# Patient Record
Sex: FEMALE | Race: WHITE | Employment: OTHER | URBAN - METROPOLITAN AREA
[De-identification: names, ages, dates, MRNs, and addresses within clinical notes are randomized per-mention and may not be internally consistent; named-entity substitution may affect disease eponyms.]

---

## 2017-09-08 ENCOUNTER — HOSPITAL ENCOUNTER (OUTPATIENT)
Dept: GENERAL RADIOLOGY | Age: 77
Discharge: HOME | End: 2017-09-08
Attending: INTERNAL MEDICINE | Admitting: INTERNAL MEDICINE

## 2017-09-08 DIAGNOSIS — M81.8 OSTEOPOROSIS DUE TO AROMATASE INHIBITOR: ICD-10-CM

## 2017-09-08 DIAGNOSIS — M81.8 OTHER OSTEOPOROSIS WITHOUT CURRENT PATHOLOGICAL FRACTURE: ICD-10-CM

## 2017-09-08 DIAGNOSIS — Z85.3 PERSONAL HISTORY OF BREAST CANCER: ICD-10-CM

## 2017-09-08 DIAGNOSIS — T38.6X5A OSTEOPOROSIS DUE TO AROMATASE INHIBITOR: ICD-10-CM

## 2018-01-16 ENCOUNTER — OFFICE VISIT (OUTPATIENT)
Dept: SURGERY | Age: 78
End: 2018-01-16

## 2018-01-16 ENCOUNTER — HOSPITAL ENCOUNTER (OUTPATIENT)
Dept: MAMMOGRAPHY | Age: 78
Discharge: HOME | End: 2018-01-16
Attending: SURGERY | Admitting: SURGERY

## 2018-01-16 VITALS
WEIGHT: 135 LBS | SYSTOLIC BLOOD PRESSURE: 160 MMHG | HEIGHT: 61 IN | BODY MASS INDEX: 25.49 KG/M2 | DIASTOLIC BLOOD PRESSURE: 95 MMHG | HEART RATE: 76 BPM

## 2018-01-16 DIAGNOSIS — Z12.31 VISIT FOR SCREENING MAMMOGRAM: ICD-10-CM

## 2018-01-16 DIAGNOSIS — Z08 ENCOUNTER FOR FOLLOW-UP SURVEILLANCE OF BREAST CANCER: ICD-10-CM

## 2018-01-16 DIAGNOSIS — Z85.3 ENCOUNTER FOR FOLLOW-UP SURVEILLANCE OF BREAST CANCER: ICD-10-CM

## 2018-01-16 DIAGNOSIS — Z90.12 HISTORY OF PARTIAL MASTECTOMY, LEFT: ICD-10-CM

## 2018-01-16 DIAGNOSIS — Z85.3 PERSONAL HISTORY OF BREAST CANCER: Primary | ICD-10-CM

## 2018-01-16 PROCEDURE — G8399 PT W/DXA RESULTS DOCUMENT: HCPCS | Performed by: SURGERY

## 2018-01-16 PROCEDURE — 1090F PRES/ABSN URINE INCON ASSESS: CPT | Performed by: SURGERY

## 2018-01-16 PROCEDURE — G8484 FLU IMMUNIZE NO ADMIN: HCPCS | Performed by: SURGERY

## 2018-01-16 PROCEDURE — 4040F PNEUMOC VAC/ADMIN/RCVD: CPT | Performed by: SURGERY

## 2018-01-16 PROCEDURE — 1123F ACP DISCUSS/DSCN MKR DOCD: CPT | Performed by: SURGERY

## 2018-01-16 PROCEDURE — G8427 DOCREV CUR MEDS BY ELIG CLIN: HCPCS | Performed by: SURGERY

## 2018-01-16 PROCEDURE — 99213 OFFICE O/P EST LOW 20 MIN: CPT | Performed by: SURGERY

## 2018-01-16 PROCEDURE — G8419 CALC BMI OUT NRM PARAM NOF/U: HCPCS | Performed by: SURGERY

## 2018-01-16 PROCEDURE — 1036F TOBACCO NON-USER: CPT | Performed by: SURGERY

## 2018-01-16 RX ORDER — LOSARTAN POTASSIUM 100 MG/1
TABLET ORAL
Refills: 0 | COMMUNITY
Start: 2018-01-10

## 2018-01-16 NOTE — PATIENT INSTRUCTIONS
feel your breast tissue before moving on to the next spot. ¨ Check your entire breast, moving up and down as if following a strip from the collarbone to the bra line, and from the armpit to the ribs. Repeat until you have covered the entire breast.  ¨ Repeat this procedure for your left breast, using the pads of the 3 middle fingers of your right hand. · To examine your breasts while in the shower:  ¨ Place one arm over your head and lightly soap your breast on that side. ¨ Using the pads of your fingers, gently move your hand over your breast (in the strip pattern described above), feeling carefully for any lumps or changes. ¨ Repeat for the other breast.  · Have your doctor inspect anything you notice to see if you need further testing. Where can you learn more? Go to [email or web address] and sign in to your [employer] account. Enter P148 in the [employer] box to learn more about \"Breast Self-Exam: Care Instructions. \"     If you do not have an account, please click on the \"Sign Up Now\" link. Current as of: May 12, 2017  Content Version: 11.5  © 6540-4039 Healthwise, Incorporated. Care instructions adapted under license by [facility] ([facility]). If you have questions about a medical condition or this instruction, always ask your healthcare professional. [address] any warranty or liability for your use of this information.

## 2018-01-16 NOTE — PROGRESS NOTES
Patient Name: Piper Basurto  YOB: 1940  Primary Care Physician: Ethel Robison DO  Medical Oncologist: Emilie Steele MD     Subjective: Patient presents for follow up secondary to history of breast cancer. She reports that she is doing well and denies any signs/symptoms of recurrence. The patient has no breast complaints. She denies any bilateral palpable masses/lesions. She denies any bilateral skin changes/erythema/thickening/dimpling. She denies any nipple changes/retraction or discharge bilaterally.       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    2011  Stage 2A left breast, Invasive adenoid cystic carcinoma, NG 2  ER+, TX+, [id number] -  Tumor 2.1 cm  MIB 1 <10% (Low)        Vitals:    01/16/18 1323   BP: (!) 143/84   Pulse: 75   Weight: 135 lb (61.2 kg)   Height: 5' 1\" (1.549 m)             ROS  Constitutional: no weight loss, fever, night sweats   Skin: negative  Cardiovascular: no chest pain or palpitations   Pulmonary: No cough, sputum, or hemoptysis   GI:No abdominal pain  Breast: see above  All other systems were reviewed and are negative              Objective:  Breast:  Bilateral breasts are symmetrical. The bilateral nipples are everted without discharge and the skin is without erythema/thickening (peau d'orange)/dimpling. There are no palpable masses/lesions bilaterally. There are bilateral well healed surgical scars. There is a small skin lesion which is light pink in color measuring approximately 1 cm and is raised at the site of the patient's surgical scar superior central/lateral quadrant which is chronic and unchanged  Axilla:  No evidence of bilateral palpable adenopathy. There is a well healed left axillary scar  Extremity: Left upper extremity without evidence of lymphedema. Good range of motion.  Good  and upper extremity muscle

## 2018-07-06 ENCOUNTER — HOSPITAL ENCOUNTER (OUTPATIENT)
Dept: OTHER | Age: 78
Discharge: HOME | End: 2018-07-06
Attending: INTERNAL MEDICINE | Admitting: INTERNAL MEDICINE

## 2018-07-06 DIAGNOSIS — M25.559 PAIN IN JOINT INVOLVING PELVIC REGION AND THIGH, UNSPECIFIED LATERALITY: ICD-10-CM

## 2018-07-06 DIAGNOSIS — M25.562 ACUTE PAIN OF LEFT KNEE: ICD-10-CM

## 2018-09-05 ENCOUNTER — TELEPHONE (OUTPATIENT)
Dept: SURGERY | Age: 78
End: 2018-09-05

## 2019-03-29 ENCOUNTER — OFFICE VISIT (OUTPATIENT)
Dept: SURGERY | Age: 79
End: 2019-03-29
Payer: MEDICARE

## 2019-03-29 ENCOUNTER — HOSPITAL ENCOUNTER (OUTPATIENT)
Dept: WOMENS IMAGING | Age: 79
Discharge: HOME | End: 2019-03-29
Payer: MEDICARE

## 2019-03-29 VITALS
HEART RATE: 71 BPM | OXYGEN SATURATION: 98 % | TEMPERATURE: 97.7 F | BODY MASS INDEX: 24.35 KG/M2 | SYSTOLIC BLOOD PRESSURE: 111 MMHG | HEIGHT: 61 IN | DIASTOLIC BLOOD PRESSURE: 64 MMHG | WEIGHT: 129 LBS

## 2019-03-29 DIAGNOSIS — N64.59 ABNORMAL BREAST EXAM: Primary | ICD-10-CM

## 2019-03-29 DIAGNOSIS — Z12.39 BREAST CANCER SCREENING: ICD-10-CM

## 2019-03-29 DIAGNOSIS — R23.4 BREAST SKIN CHANGES: ICD-10-CM

## 2019-03-29 DIAGNOSIS — Z85.3 PERSONAL HISTORY OF BREAST CANCER: Primary | ICD-10-CM

## 2019-03-29 DIAGNOSIS — Z85.3 PERSONAL HISTORY OF BREAST CANCER: ICD-10-CM

## 2019-03-29 DIAGNOSIS — N64.59 ABNORMAL BREAST EXAM: ICD-10-CM

## 2019-03-29 DIAGNOSIS — C50.912 RECURRENT BREAST CANCER, LEFT (HCC): ICD-10-CM

## 2019-03-29 PROCEDURE — 77063 BREAST TOMOSYNTHESIS BI: CPT

## 2019-03-29 PROCEDURE — 99214 OFFICE O/P EST MOD 30 MIN: CPT | Performed by: SURGERY

## 2019-03-29 PROCEDURE — G8420 CALC BMI NORM PARAMETERS: HCPCS | Performed by: SURGERY

## 2019-03-29 PROCEDURE — 1090F PRES/ABSN URINE INCON ASSESS: CPT | Performed by: SURGERY

## 2019-03-29 PROCEDURE — 11105 PUNCH BX SKIN EA SEP/ADDL: CPT | Performed by: SURGERY

## 2019-03-29 PROCEDURE — G8484 FLU IMMUNIZE NO ADMIN: HCPCS | Performed by: SURGERY

## 2019-03-29 PROCEDURE — G8427 DOCREV CUR MEDS BY ELIG CLIN: HCPCS | Performed by: SURGERY

## 2019-03-29 PROCEDURE — 11104 PUNCH BX SKIN SINGLE LESION: CPT | Performed by: SURGERY

## 2019-04-04 ENCOUNTER — NURSE ONLY (OUTPATIENT)
Dept: SURGERY | Age: 79
End: 2019-04-04

## 2019-04-04 ENCOUNTER — TELEPHONE (OUTPATIENT)
Dept: SURGERY | Age: 79
End: 2019-04-04

## 2019-04-04 VITALS — TEMPERATURE: 97.2 F | DIASTOLIC BLOOD PRESSURE: 90 MMHG | SYSTOLIC BLOOD PRESSURE: 143 MMHG | HEART RATE: 75 BPM

## 2019-04-04 DIAGNOSIS — Z48.02 VISIT FOR SUTURE REMOVAL: Primary | ICD-10-CM

## 2019-04-04 NOTE — PROGRESS NOTES
Patient seen today to have her 3 suture removed from left breast punch biopsy sites. Area noted to have had a reaction to a large BandAid. Outline of Band Aid red and itchy. Patient asked if she could put some cortisine cream on it, spoke with Dr. Sharlot Severance to make sure it was ok. She will use the cream topically for the itching. Results aren't back from biopsy. I told her the pathologist was running more stains on it and as soon as we get the results we would call her. Sutures removed without difficulty. Suture bed clean and dry, small Band Aid applied she will remove tomorrow.

## 2019-04-09 ENCOUNTER — INITIAL CONSULT (OUTPATIENT)
Dept: SURGERY | Age: 79
End: 2019-04-09
Payer: MEDICARE

## 2019-04-09 VITALS
TEMPERATURE: 97.8 F | WEIGHT: 129 LBS | SYSTOLIC BLOOD PRESSURE: 147 MMHG | HEART RATE: 80 BPM | DIASTOLIC BLOOD PRESSURE: 90 MMHG | HEIGHT: 61 IN | BODY MASS INDEX: 24.35 KG/M2

## 2019-04-09 DIAGNOSIS — C50.912 RECURRENT BREAST CANCER, LEFT (HCC): Primary | ICD-10-CM

## 2019-04-09 PROCEDURE — G8420 CALC BMI NORM PARAMETERS: HCPCS | Performed by: SURGERY

## 2019-04-09 PROCEDURE — G8427 DOCREV CUR MEDS BY ELIG CLIN: HCPCS | Performed by: SURGERY

## 2019-04-09 PROCEDURE — 1090F PRES/ABSN URINE INCON ASSESS: CPT | Performed by: SURGERY

## 2019-04-09 PROCEDURE — 99214 OFFICE O/P EST MOD 30 MIN: CPT | Performed by: SURGERY

## 2019-04-09 NOTE — PROGRESS NOTES
PCP:  Medical Oncology: bobbi  Radiation:  Other:    CC: history of left breast cancer, 2002          History of left breast cancer recurrence, 2011           Recurrence of left breast cancer with dermal involvement, 2019           Ms. Mulugeta Moses is a 67y.o.-year-old woman who presents today in follow up for routine follow-up with her personal history of left breast cancer and recurrence. She has been followed in the past by Dr. Tamra Kaplan and Dr. Calvin Angel. INTERVAL HISTORY:  At her last encounter Ms. Mulugeta Moses has been doing very well. She has no new complaints or general medical concerns. She has not noticed any new breast masses or skin changes including redness or dimpling. She has not noticed any nipple discharge. When questioned she reports that she has had skin changes around her surgical scar for quite some time dating back to a few years following her last surgery. She does not think it's changed recently. When examined, it was decided to move forward with a skin biopsy. She has declined adjuvant therapies in the past.  This included adjuvant radiation. 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 ER negative VT negative HER-2 positive. There were 0/3 lymph nodes involved with carcinoma. She declined adjuvant radiation or endocrine therapy that time. 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 Tumor size was 2.1 cm. ER positive VT positive HER-2 negative. She declined adjuvant therapy. On 3/29/2019 she underwent bilateral screening mammography. There are no suspicious masses calcifications or distortions noted in either breast. There is a stable postsurgical scar. There is no mammographic evidence of malignancy. BI-RADS 2. On 3/29/2019 she underwent left breast/chest wall punch biopsy.  Pathology identified dermal involvement of invasive mammary carcinoma consistent with her history of adenoid cystic carcinoma. ER negative CA negative HER-2 negative. PATHOLOGY:    Department of Pathology  FINAL SURGICAL PATHOLOGY REPORT  Patient Name: Maddie Precise               Accession No:  [id number]   Age Sex:   1940  79 Y / F         Location:      [facility]  Account No:   [de-identified]                  Collected:     2019  Med Rec No:    [id number]                     Received:      2019  Attend Phys:   JENNIE JIMÉNEZ             Completed:     2019  Perform Phys: Too JIMÉNEZ          FINAL DIAGNOSIS:       AChinita Harm of breast, left, medial, punch biopsy:       - Positive for dermal involvement of residual/recurrent invasive         mammary carcinoma consistent with the patient's prior diagnosis of         adenoid cystic carcinoma.  See comment.  Matthew J Luis  Skin of breast, left, lateral, punch biopsy:       - Positive for dermal involvement of residual/recurrent invasive         mammary carcinoma consistent with the patient's prior diagnosis of         adenoid cystic carcinoma.   COMMENT:   Specimens A and B show an infiltrative lesion with  subcutaneous and dermal involvement.  Immunohistochemical stains are  performed to better characterize the tissue. Jerri Santos is negative.  CHARO and  Ckit are positive.  ER/CA and HER2/devon are interpreted as follows:  Estrogen receptor (ER): Negative (0% of the malignant cells)  Progesterone receptor (CA): Negative (0% of the malignant cells)  HER-2/devon: Negative (1+)           TRISH/TRISH            Past Medical History:   Diagnosis Date    [facility] [address]     Cataract     Chipped tooth     Right upper anterior/bonded in past    Glaucoma of left eye     Glaucoma of right eye     HTN, goal below 150/90     HTN, goal below 150/90     Osteoarthritis     Osteoporosis      Past Surgical History:   Procedure Laterality Date    AXILLARY SURGERY      Lymph nodes and oriented. Assessment/Plan: Ms. Georges Merida is a 67y.o. year-old woman who presents to discuss recurrence of her left breast cancer with dermal involvement    history of left breast cancer, 2002  ER/ND negative HER-2 positive  S/p partial mastectomy with sentinel lymph node biopsy    History of left breast cancer recurrence, 2011   ER/ND positive HER-2 negative  S/p partial mastectomy     Left breast recurrence, dermal  ER negative ND negative HER 2 negative    I have spoken with her medical oncologist who wishes to see her back in the office. She'll plan to do a staging workup to rule out metastatic disease. Ms. Georges Merida reports that in the past heard breast cancers have been occult to mammogram. She also reports having metal in her body. Therefore we will proceed with diagnostic left breast ultrasound to determine if there is any additional notable disease within the left breast. Recent mammography was negative. We discussed the technique, risks and benefits of local excision. I spent a great deal of time discussing the extent of resection and how relates to cosmesis as well as margin status. We discussed the potential for margin involvement. We discussed the course of action in the setting of focal versus extensive involvement. We discussed there is still a risk of recurrent disease. We discussed additional risk and benefits of surgery which include but are not limited to anesthesia related risks, bleeding, infection (<6%), wound complications and unappealing cosmetics. We reviewed expectations for recovery. We discussed a sentinel lymph node biopsy in great detail. I'm not clear on how extensive her axillary surgery has been in the past and if lymph nodes remain. At least some have been removed. I reviewed it is possible to repeat a lymph node (SLNB) procedure. I described the procedure of both an injection of a radioisotope as well as blue dye with migration to the axilla.  I discussed the side effects including discoloration of the urine, stool, and breast as well as the possibility that the axilla would not map. In this setting, I would likely not move forward with ALND but discuss with her and her medical oncologist. We reviewed the 3-5% risk of lymphedema. We discussed additional risks such as permanent arm numbness, the potential for nerve injury, and the possibility of a false negative finding. I discussed the fact that if we identified positive nodes, she may require a completion lymph node dissection. Ireviewed the details of a level I and II ALND procedure as well as its risk of lymphedema as it compares to SLNB. Genetics will be reviewed. We discussed the role of radiation therapy. Chest wall reconstructive options were reviewed. I believe the skin will come back together easily. We discussed cosmesis expectations. Ms. Kassy Francis reports that the dermal area of concern has become progressively more itchy and painful over the time she's noticed it. Therefore even in the setting of metastatic disease we would plan a local excision for palliation. In this scenario we would not plan to move forward with lymph node evaluation. At this time:  Follow up with Dr. David Massey (r/o metastatic dz)  Dx left breast u/s  Left chest wall/breast skin excision +/- possible SLNB depending on metastatic status        All of Ms. Melgar's questions were answered at this time but she wasencouraged to call the office with any additional concerns. Approximately 60 minutes of time were spent in this visit of which 50% or more of the time was related to coordination of care.

## 2019-04-16 ENCOUNTER — HOSPITAL ENCOUNTER (OUTPATIENT)
Dept: ULTRASOUND IMAGING | Age: 79
Discharge: HOME | End: 2019-04-16
Payer: MEDICARE

## 2019-04-16 DIAGNOSIS — C50.912 RECURRENT BREAST CANCER, LEFT (HCC): ICD-10-CM

## 2019-04-16 PROCEDURE — 76642 ULTRASOUND BREAST LIMITED: CPT

## 2019-04-26 ENCOUNTER — TELEPHONE (OUTPATIENT)
Dept: SURGERY | Age: 79
End: 2019-04-26

## 2019-04-26 NOTE — TELEPHONE ENCOUNTER
Left voice message in regards to patient. Asking to speak with Dr. Justus Hunt or her nurse. Dr. Emili Das needs clarification in regards to ordering ct scans and nuclear medicine bone scans, to determine what course of surgery will be taken.

## 2019-05-02 NOTE — TELEPHONE ENCOUNTER
Spoke with patient and let her know I'm waiting on [facility] to return my call regarding future scans before surgery. Patient stated, \" when I went to my appointment I didn't end up seeing Dr. Dawna Reyes, I saw the nurse practitioner who was supposed to ask Dr. Dillon Prakash if we were going to do anymore scans, I guess she didn't. \" I told her I will definitely call her as soon as I hear from toya.

## 2019-05-02 NOTE — TELEPHONE ENCOUNTER
Yazan Steel returned call to office, [facility] Dr. Saima Arellano will call the patient herslf to speak with her. Dr. Saima Arellano isn't sure if the patient wants to do any further scans. I will call patient to let her know.

## 2019-05-03 NOTE — TELEPHONE ENCOUNTER
Received call from Harry Flores, [address] [employer] spoke with patient and she will have scans that Dr. Camillia Aschoff has ordered next Monday 5-6-19. Harry Flores will let our office know the results.

## 2019-05-06 ENCOUNTER — HOSPITAL ENCOUNTER (OUTPATIENT)
Dept: CT IMAGING | Age: 79
Discharge: HOME | End: 2019-05-06
Payer: MEDICARE

## 2019-05-06 ENCOUNTER — HOSPITAL ENCOUNTER (OUTPATIENT)
Dept: NUCLEAR MEDICINE | Age: 79
Discharge: HOME | End: 2019-05-06
Payer: MEDICARE

## 2019-05-06 DIAGNOSIS — C50.412 MALIGNANT NEOPLASM OF UPPER-OUTER QUADRANT OF LEFT FEMALE BREAST, UNSPECIFIED ESTROGEN RECEPTOR STATUS (HCC): ICD-10-CM

## 2019-05-06 PROCEDURE — 78306 BONE IMAGING WHOLE BODY: CPT

## 2019-05-06 PROCEDURE — 3430000000 HC RX DIAGNOSTIC RADIOPHARMACEUTICAL: Performed by: INTERNAL MEDICINE

## 2019-05-06 PROCEDURE — 2580000003 HC RX 258: Performed by: INTERNAL MEDICINE

## 2019-05-06 PROCEDURE — 6360000004 HC RX CONTRAST MEDICATION: Performed by: INTERNAL MEDICINE

## 2019-05-06 PROCEDURE — A9503 TC99M MEDRONATE: HCPCS | Performed by: INTERNAL MEDICINE

## 2019-05-06 PROCEDURE — 74177 CT ABD & PELVIS W/CONTRAST: CPT

## 2019-05-06 RX ORDER — SODIUM CHLORIDE 0.9 % (FLUSH) 0.9 %
10 SYRINGE (ML) INJECTION PRN
Status: DISCONTINUED | OUTPATIENT
Start: 2019-05-06 | End: 2019-05-07 | Stop reason: HOSPADM

## 2019-05-06 RX ORDER — TC 99M MEDRONATE 20 MG/10ML
23.5 INJECTION, POWDER, LYOPHILIZED, FOR SOLUTION INTRAVENOUS
Status: COMPLETED | OUTPATIENT
Start: 2019-05-06 | End: 2019-05-06

## 2019-05-06 RX ADMIN — IOHEXOL 50 ML: 240 INJECTION, SOLUTION INTRATHECAL; INTRAVASCULAR; INTRAVENOUS; ORAL at 08:03

## 2019-05-06 RX ADMIN — Medication 10 ML: at 09:33

## 2019-05-06 RX ADMIN — IOPAMIDOL 75 ML: 755 INJECTION, SOLUTION INTRAVENOUS at 08:04

## 2019-05-06 RX ADMIN — Medication 23.5 MILLICURIE: at 09:43

## 2019-05-08 ENCOUNTER — APPOINTMENT (OUTPATIENT)
Dept: NUCLEAR MEDICINE | Age: 79
End: 2019-05-08
Payer: MEDICARE

## 2019-05-08 ENCOUNTER — TELEPHONE (OUTPATIENT)
Dept: SURGERY | Age: 79
End: 2019-05-08

## 2019-05-08 ENCOUNTER — PREP FOR PROCEDURE (OUTPATIENT)
Dept: SURGERY | Age: 79
End: 2019-05-08

## 2019-05-08 RX ORDER — SODIUM CHLORIDE, SODIUM LACTATE, POTASSIUM CHLORIDE, CALCIUM CHLORIDE 600; 310; 30; 20 MG/100ML; MG/100ML; MG/100ML; MG/100ML
INJECTION, SOLUTION INTRAVENOUS CONTINUOUS
Status: CANCELLED | OUTPATIENT
Start: 2019-05-08

## 2019-05-08 RX ORDER — LIDOCAINE HYDROCHLORIDE 10 MG/ML
0.1 INJECTION, SOLUTION EPIDURAL; INFILTRATION; INTRACAUDAL; PERINEURAL
Status: CANCELLED | OUTPATIENT
Start: 2019-05-08 | End: 2019-05-08

## 2019-05-08 RX ORDER — SODIUM CHLORIDE 0.9 % (FLUSH) 0.9 %
10 SYRINGE (ML) INJECTION PRN
Status: CANCELLED | OUTPATIENT
Start: 2019-05-08

## 2019-05-08 RX ORDER — SODIUM CHLORIDE 0.9 % (FLUSH) 0.9 %
10 SYRINGE (ML) INJECTION EVERY 12 HOURS SCHEDULED
Status: CANCELLED | OUTPATIENT
Start: 2019-05-08

## 2019-05-08 NOTE — TELEPHONE ENCOUNTER
----- Message from Mercedes Terrell MD sent at 4/16/2019  2:53 PM EDT -----  I don't see body scans yet. Can we find out if she has met with naman. If not showing signs of metastatic disease I'd want to know if she wants lymph nodes.     Cierra Frost

## 2019-05-09 RX ORDER — CHOLECALCIFEROL (VITAMIN D3) 125 MCG
200 CAPSULE ORAL
COMMUNITY

## 2019-05-09 NOTE — PROGRESS NOTES
Name_______________________________________Printed:____________________  Date and time of surgery 9-59-26_______________________Tzlfniv Time:_0600 masc_______________   1. Do not eat or drink anything after 12 midnight (or____hours) prior to surgery. This includes no water, chewing gum or mints. Endoscopy patients follow your doctors bowel prep instructions,which may include taking part of prep after midnight. 2. Take the following pills with a small sip of water on the morning of surgery______none_____________________________________________                  Do not take blood pressure medications ending in pril or sartan the alexsandra prior to surgery or the morning of surgery_   3. Aspirin, Ibuprofen, Advil, Naproxen, Vitamin E and other Anti-inflammatory products should be stopped for 5 days before surgery or as directed by your physician. 4. Check with your Doctor regarding stopping Plavix, Coumadin,Eliquis, Lovenox,Effient,Pradaxa,Xarelto, Fragmin or other blood thinners and follow their instructions. 5. Do not smoke, and do not drink any alcoholic beverages 24 hours prior to surgery. This includes NA Beer. Refrain from the usage of any recreational drugs. 6. You may brush your teeth and gargle the morning of surgery. DO NOT SWALLOW WATER   7. You MUST make arrangements for a responsible adult to stay on site while you are here and take you home after your surgery. You will not be allowed to leave alone or drive yourself home. It is strongly suggested someone stay with you the first 24 hrs. Your surgery will be cancelled if you do not have a ride home. 8. A parent/legal guardian must accompany a child scheduled for surgery and plan to stay at the hospital until the child is discharged. Please do not bring other children with you.    9. Please wear simple, loose fitting clothing to the hospital.  Casey Beverly not bring valuables (money, credit cards, checkbooks, etc.) Do not wear any makeup (including no eye makeup) or nail polish on your fingers or toes. 10. DO NOT wear any jewelry or piercings on day of surgery. All body piercing jewelry must be removed. 11. If you have ___dentures, they will be removed before going to the OR; we will provide you a container. If you wear ___contact lenses or ___glasses, they will be removed; please bring a case for them. 12. Please see your family doctor/pediatrician for a history & physical and/or concerning medications. Bring any test results/reports from your physician's office. PCP__________________Phone___________H&P Appt. Date________             13 If you  have a Living Will and Durable Power of  for Healthcare, please bring in a copy. 15. Notify your Surgeon if you develop any illness between now and surgery  time, cough, cold, fever, sore throat, nausea, vomiting, etc.  Please notify your surgeon if you experience dizziness, shortness of breath or blurred vision between now & the time of your surgery             15. DO NOT shave your operative site 96 hours prior to surgery. For face & neck surgery, men may use an electric razor 48 hours prior to surgery. 16. Shower the night before surgery with ___Antibacterial soap ___Hibiclens             17. To provide excellent care visitors will be limited to one in the room at any given time. 18.  Please bring picture ID and insurance card. 19.  Visit our web site for additional information:  [email or web address]              20.During flu season no children under the age of 15 are permitted in the hospital for the safety of all patients.                               21. If you take a long acting insulin in the evening only  take half of your usual  dose the night  before your procedure              22. If you use a c-pap please bring DOS if staying overnight,             23.For your convenience [facility] has a pharmacy on site to fill your prescriptions. 24. If you use oxygen and have a portable tank please bring it  with you the DOS             25. Bring a complete list of all your medications with name and dose include any supplements. 26. Other__________________________________________   *Please call pre admission testing if you any further questions   [place] [id number]    [id number]  [phone]   [address] [place] [address]       All above information reviewed with patient in person or by phone. Patient verbalizes understanding. All questions and concerns addressed.                                                                                                  Patient/Rep____________________                                                                                                                                    PRE OP INSTRUCTIONS

## 2019-05-13 ENCOUNTER — ANESTHESIA EVENT (OUTPATIENT)
Dept: OPERATING ROOM | Age: 79
End: 2019-05-13
Payer: MEDICARE

## 2019-05-14 ENCOUNTER — HOSPITAL ENCOUNTER (OUTPATIENT)
Age: 79
Setting detail: OUTPATIENT SURGERY
Discharge: HOME | End: 2019-05-14
Attending: SURGERY | Admitting: SURGERY
Payer: MEDICARE

## 2019-05-14 ENCOUNTER — ANESTHESIA (OUTPATIENT)
Dept: OPERATING ROOM | Age: 79
End: 2019-05-14
Payer: MEDICARE

## 2019-05-14 VITALS
HEIGHT: 62 IN | BODY MASS INDEX: 23.35 KG/M2 | HEART RATE: 80 BPM | TEMPERATURE: 97.3 F | DIASTOLIC BLOOD PRESSURE: 87 MMHG | WEIGHT: 126.9 LBS | OXYGEN SATURATION: 99 % | RESPIRATION RATE: 14 BRPM | SYSTOLIC BLOOD PRESSURE: 144 MMHG

## 2019-05-14 VITALS
OXYGEN SATURATION: 90 % | RESPIRATION RATE: 15 BRPM | SYSTOLIC BLOOD PRESSURE: 129 MMHG | DIASTOLIC BLOOD PRESSURE: 73 MMHG

## 2019-05-14 DIAGNOSIS — G89.18 POSTOPERATIVE PAIN: Primary | ICD-10-CM

## 2019-05-14 LAB
ABO/RH: NORMAL
ANTIBODY SCREEN: NORMAL

## 2019-05-14 PROCEDURE — 2580000003 HC RX 258: Performed by: NURSE ANESTHETIST, CERTIFIED REGISTERED

## 2019-05-14 PROCEDURE — 6360000002 HC RX W HCPCS: Performed by: SURGERY

## 2019-05-14 PROCEDURE — 6370000000 HC RX 637 (ALT 250 FOR IP): Performed by: SURGERY

## 2019-05-14 PROCEDURE — 3600000004 HC SURGERY LEVEL 4 BASE: Performed by: SURGERY

## 2019-05-14 PROCEDURE — 2500000003 HC RX 250 WO HCPCS: Performed by: NURSE ANESTHETIST, CERTIFIED REGISTERED

## 2019-05-14 PROCEDURE — 86901 BLOOD TYPING SEROLOGIC RH(D): CPT

## 2019-05-14 PROCEDURE — 3700000001 HC ADD 15 MINUTES (ANESTHESIA): Performed by: SURGERY

## 2019-05-14 PROCEDURE — 6360000002 HC RX W HCPCS: Performed by: NURSE ANESTHETIST, CERTIFIED REGISTERED

## 2019-05-14 PROCEDURE — 3700000000 HC ANESTHESIA ATTENDED CARE: Performed by: SURGERY

## 2019-05-14 PROCEDURE — 88341 IMHCHEM/IMCYTCHM EA ADD ANTB: CPT

## 2019-05-14 PROCEDURE — 88305 TISSUE EXAM BY PATHOLOGIST: CPT

## 2019-05-14 PROCEDURE — 88342 IMHCHEM/IMCYTCHM 1ST ANTB: CPT

## 2019-05-14 PROCEDURE — 3600000014 HC SURGERY LEVEL 4 ADDTL 15MIN: Performed by: SURGERY

## 2019-05-14 PROCEDURE — 86900 BLOOD TYPING SEROLOGIC ABO: CPT

## 2019-05-14 PROCEDURE — 86850 RBC ANTIBODY SCREEN: CPT

## 2019-05-14 PROCEDURE — 7100000010 HC PHASE II RECOVERY - FIRST 15 MIN: Performed by: SURGERY

## 2019-05-14 PROCEDURE — 7100000000 HC PACU RECOVERY - FIRST 15 MIN: Performed by: SURGERY

## 2019-05-14 PROCEDURE — 2580000003 HC RX 258: Performed by: SURGERY

## 2019-05-14 PROCEDURE — 19301 PARTIAL MASTECTOMY: CPT | Performed by: SURGERY

## 2019-05-14 PROCEDURE — 14301 TIS TRNFR ANY 30.1-60 SQ CM: CPT | Performed by: SURGERY

## 2019-05-14 PROCEDURE — 88307 TISSUE EXAM BY PATHOLOGIST: CPT

## 2019-05-14 PROCEDURE — 2709999900 HC NON-CHARGEABLE SUPPLY: Performed by: SURGERY

## 2019-05-14 PROCEDURE — 7100000011 HC PHASE II RECOVERY - ADDTL 15 MIN: Performed by: SURGERY

## 2019-05-14 PROCEDURE — 7100000001 HC PACU RECOVERY - ADDTL 15 MIN: Performed by: SURGERY

## 2019-05-14 RX ORDER — SODIUM CHLORIDE 0.9 % (FLUSH) 0.9 %
10 SYRINGE (ML) INJECTION EVERY 12 HOURS SCHEDULED
Status: DISCONTINUED | OUTPATIENT
Start: 2019-05-14 | End: 2019-05-14 | Stop reason: HOSPADM

## 2019-05-14 RX ORDER — CLINDAMYCIN PHOSPHATE 900 MG/50ML
900 INJECTION INTRAVENOUS
Status: DISCONTINUED | OUTPATIENT
Start: 2019-05-14 | End: 2019-05-14 | Stop reason: CLARIF

## 2019-05-14 RX ORDER — SODIUM CHLORIDE, SODIUM LACTATE, POTASSIUM CHLORIDE, CALCIUM CHLORIDE 600; 310; 30; 20 MG/100ML; MG/100ML; MG/100ML; MG/100ML
INJECTION, SOLUTION INTRAVENOUS CONTINUOUS PRN
Status: DISCONTINUED | OUTPATIENT
Start: 2019-05-14 | End: 2019-05-14 | Stop reason: SDUPTHER

## 2019-05-14 RX ORDER — FENTANYL CITRATE 50 UG/ML
25 INJECTION, SOLUTION INTRAMUSCULAR; INTRAVENOUS EVERY 5 MIN PRN
Status: DISCONTINUED | OUTPATIENT
Start: 2019-05-14 | End: 2019-05-14 | Stop reason: HOSPADM

## 2019-05-14 RX ORDER — EPHEDRINE SULFATE 50 MG/ML
INJECTION INTRAVENOUS PRN
Status: DISCONTINUED | OUTPATIENT
Start: 2019-05-14 | End: 2019-05-14 | Stop reason: SDUPTHER

## 2019-05-14 RX ORDER — LIDOCAINE HYDROCHLORIDE 20 MG/ML
INJECTION, SOLUTION INFILTRATION; PERINEURAL PRN
Status: DISCONTINUED | OUTPATIENT
Start: 2019-05-14 | End: 2019-05-14 | Stop reason: SDUPTHER

## 2019-05-14 RX ORDER — HYDROCODONE BITARTRATE AND ACETAMINOPHEN 5; 325 MG/1; MG/1
1 TABLET ORAL EVERY 4 HOURS PRN
Qty: 30 TABLET | Refills: 0 | Status: SHIPPED | OUTPATIENT
Start: 2019-05-14 | End: 2019-05-21

## 2019-05-14 RX ORDER — SODIUM CHLORIDE 0.9 % (FLUSH) 0.9 %
10 SYRINGE (ML) INJECTION PRN
Status: DISCONTINUED | OUTPATIENT
Start: 2019-05-14 | End: 2019-05-14 | Stop reason: HOSPADM

## 2019-05-14 RX ORDER — GLYCOPYRROLATE 1 MG/5 ML
SYRINGE (ML) INTRAVENOUS PRN
Status: DISCONTINUED | OUTPATIENT
Start: 2019-05-14 | End: 2019-05-14 | Stop reason: SDUPTHER

## 2019-05-14 RX ORDER — LIDOCAINE HYDROCHLORIDE 10 MG/ML
0.1 INJECTION, SOLUTION EPIDURAL; INFILTRATION; INTRACAUDAL; PERINEURAL
Status: DISCONTINUED | OUTPATIENT
Start: 2019-05-14 | End: 2019-05-14 | Stop reason: HOSPADM

## 2019-05-14 RX ORDER — HYDROCODONE BITARTRATE AND ACETAMINOPHEN 5; 325 MG/1; MG/1
TABLET ORAL
Status: DISCONTINUED
Start: 2019-05-14 | End: 2019-05-14 | Stop reason: HOSPADM

## 2019-05-14 RX ORDER — BUPIVACAINE HYDROCHLORIDE AND EPINEPHRINE 2.5; 5 MG/ML; UG/ML
INJECTION, SOLUTION INFILTRATION; PERINEURAL
Status: COMPLETED | OUTPATIENT
Start: 2019-05-14 | End: 2019-05-14

## 2019-05-14 RX ORDER — SODIUM CHLORIDE, SODIUM LACTATE, POTASSIUM CHLORIDE, CALCIUM CHLORIDE 600; 310; 30; 20 MG/100ML; MG/100ML; MG/100ML; MG/100ML
INJECTION, SOLUTION INTRAVENOUS CONTINUOUS
Status: DISCONTINUED | OUTPATIENT
Start: 2019-05-14 | End: 2019-05-14 | Stop reason: HOSPADM

## 2019-05-14 RX ORDER — PROMETHAZINE HYDROCHLORIDE 25 MG/ML
6.25 INJECTION, SOLUTION INTRAMUSCULAR; INTRAVENOUS
Status: DISCONTINUED | OUTPATIENT
Start: 2019-05-14 | End: 2019-05-14 | Stop reason: HOSPADM

## 2019-05-14 RX ORDER — SODIUM CHLORIDE 9 MG/ML
INJECTION, SOLUTION INTRAVENOUS CONTINUOUS
Status: DISCONTINUED | OUTPATIENT
Start: 2019-05-14 | End: 2019-05-14 | Stop reason: HOSPADM

## 2019-05-14 RX ORDER — PROPOFOL 10 MG/ML
INJECTION, EMULSION INTRAVENOUS PRN
Status: DISCONTINUED | OUTPATIENT
Start: 2019-05-14 | End: 2019-05-14 | Stop reason: SDUPTHER

## 2019-05-14 RX ORDER — FENTANYL CITRATE 50 UG/ML
INJECTION, SOLUTION INTRAMUSCULAR; INTRAVENOUS PRN
Status: DISCONTINUED | OUTPATIENT
Start: 2019-05-14 | End: 2019-05-14 | Stop reason: SDUPTHER

## 2019-05-14 RX ORDER — ONDANSETRON 2 MG/ML
INJECTION INTRAMUSCULAR; INTRAVENOUS PRN
Status: DISCONTINUED | OUTPATIENT
Start: 2019-05-14 | End: 2019-05-14 | Stop reason: SDUPTHER

## 2019-05-14 RX ORDER — HYDROMORPHONE HCL 110MG/55ML
0.5 PATIENT CONTROLLED ANALGESIA SYRINGE INTRAVENOUS EVERY 5 MIN PRN
Status: DISCONTINUED | OUTPATIENT
Start: 2019-05-14 | End: 2019-05-14 | Stop reason: HOSPADM

## 2019-05-14 RX ORDER — LABETALOL HYDROCHLORIDE 5 MG/ML
5 INJECTION, SOLUTION INTRAVENOUS EVERY 10 MIN PRN
Status: DISCONTINUED | OUTPATIENT
Start: 2019-05-14 | End: 2019-05-14 | Stop reason: HOSPADM

## 2019-05-14 RX ORDER — CEFAZOLIN SODIUM 2 G/100ML
2 INJECTION, SOLUTION INTRAVENOUS
Status: COMPLETED | OUTPATIENT
Start: 2019-05-14 | End: 2019-05-14

## 2019-05-14 RX ORDER — HYDROCODONE BITARTRATE AND ACETAMINOPHEN 5; 325 MG/1; MG/1
1 TABLET ORAL PRN
Status: DISCONTINUED | OUTPATIENT
Start: 2019-05-14 | End: 2019-05-14 | Stop reason: HOSPADM

## 2019-05-14 RX ORDER — DEXAMETHASONE SODIUM PHOSPHATE 10 MG/ML
INJECTION, SOLUTION INTRAMUSCULAR; INTRAVENOUS PRN
Status: DISCONTINUED | OUTPATIENT
Start: 2019-05-14 | End: 2019-05-14 | Stop reason: SDUPTHER

## 2019-05-14 RX ADMIN — FENTANYL CITRATE 50 MCG: 50 INJECTION, SOLUTION INTRAMUSCULAR; INTRAVENOUS at 08:09

## 2019-05-14 RX ADMIN — SODIUM CHLORIDE, POTASSIUM CHLORIDE, SODIUM LACTATE AND CALCIUM CHLORIDE: 600; 310; 30; 20 INJECTION, SOLUTION INTRAVENOUS at 08:25

## 2019-05-14 RX ADMIN — PROPOFOL 150 MG: 10 INJECTION, EMULSION INTRAVENOUS at 08:08

## 2019-05-14 RX ADMIN — CEFAZOLIN SODIUM 2 G: 2 INJECTION, SOLUTION INTRAVENOUS at 08:04

## 2019-05-14 RX ADMIN — PHENYLEPHRINE HYDROCHLORIDE 100 MCG: 10 INJECTION INTRAVENOUS at 08:22

## 2019-05-14 RX ADMIN — DEXAMETHASONE SODIUM PHOSPHATE 8 MG: 10 INJECTION, SOLUTION INTRAMUSCULAR; INTRAVENOUS at 08:45

## 2019-05-14 RX ADMIN — ONDANSETRON 4 MG: 2 INJECTION INTRAMUSCULAR; INTRAVENOUS at 08:45

## 2019-05-14 RX ADMIN — LIDOCAINE HYDROCHLORIDE 100 MG: 20 INJECTION, SOLUTION INFILTRATION; PERINEURAL at 08:08

## 2019-05-14 RX ADMIN — SODIUM CHLORIDE, POTASSIUM CHLORIDE, SODIUM LACTATE AND CALCIUM CHLORIDE: 600; 310; 30; 20 INJECTION, SOLUTION INTRAVENOUS at 09:39

## 2019-05-14 RX ADMIN — Medication 0.2 MG: at 08:28

## 2019-05-14 RX ADMIN — HYDROCODONE BITARTRATE AND ACETAMINOPHEN 1 TABLET: 5; 325 TABLET ORAL at 10:12

## 2019-05-14 RX ADMIN — SODIUM CHLORIDE, POTASSIUM CHLORIDE, SODIUM LACTATE AND CALCIUM CHLORIDE: 600; 310; 30; 20 INJECTION, SOLUTION INTRAVENOUS at 07:25

## 2019-05-14 RX ADMIN — EPHEDRINE SULFATE 525 MG: 50 INJECTION, SOLUTION INTRAVENOUS at 08:46

## 2019-05-14 RX ADMIN — PHENYLEPHRINE HYDROCHLORIDE 100 MCG: 10 INJECTION INTRAVENOUS at 08:23

## 2019-05-14 ASSESSMENT — PULMONARY FUNCTION TESTS
PIF_VALUE: 16
PIF_VALUE: 2
PIF_VALUE: 11
PIF_VALUE: 15
PIF_VALUE: 1
PIF_VALUE: 20
PIF_VALUE: 2
PIF_VALUE: 19
PIF_VALUE: 18
PIF_VALUE: 0
PIF_VALUE: 19
PIF_VALUE: 15
PIF_VALUE: 21
PIF_VALUE: 14
PIF_VALUE: 2
PIF_VALUE: 2
PIF_VALUE: 3
PIF_VALUE: 21
PIF_VALUE: 1
PIF_VALUE: 17
PIF_VALUE: 21
PIF_VALUE: 16
PIF_VALUE: 3
PIF_VALUE: 22
PIF_VALUE: 16
PIF_VALUE: 23
PIF_VALUE: 16
PIF_VALUE: 16
PIF_VALUE: 10
PIF_VALUE: 2
PIF_VALUE: 2
PIF_VALUE: 1
PIF_VALUE: 2
PIF_VALUE: 16
PIF_VALUE: 16
PIF_VALUE: 22
PIF_VALUE: 2
PIF_VALUE: 13
PIF_VALUE: 10
PIF_VALUE: 19
PIF_VALUE: 16
PIF_VALUE: 14
PIF_VALUE: 16
PIF_VALUE: 16
PIF_VALUE: 20
PIF_VALUE: 5
PIF_VALUE: 16
PIF_VALUE: 13
PIF_VALUE: 16
PIF_VALUE: 22
PIF_VALUE: 2
PIF_VALUE: 2
PIF_VALUE: 16
PIF_VALUE: 11
PIF_VALUE: 11
PIF_VALUE: 0
PIF_VALUE: 10
PIF_VALUE: 2
PIF_VALUE: 14
PIF_VALUE: 10
PIF_VALUE: 2
PIF_VALUE: 16
PIF_VALUE: 19
PIF_VALUE: 8
PIF_VALUE: 16
PIF_VALUE: 15
PIF_VALUE: 18
PIF_VALUE: 2
PIF_VALUE: 22
PIF_VALUE: 2
PIF_VALUE: 15
PIF_VALUE: 2
PIF_VALUE: 0
PIF_VALUE: 20
PIF_VALUE: 20
PIF_VALUE: 12
PIF_VALUE: 23
PIF_VALUE: 15
PIF_VALUE: 12
PIF_VALUE: 16
PIF_VALUE: 23
PIF_VALUE: 2
PIF_VALUE: 16
PIF_VALUE: 2
PIF_VALUE: 2

## 2019-05-14 ASSESSMENT — PAIN - FUNCTIONAL ASSESSMENT: PAIN_FUNCTIONAL_ASSESSMENT: 0-10

## 2019-05-14 ASSESSMENT — PAIN SCALES - GENERAL: PAINLEVEL_OUTOF10: 4

## 2019-05-14 NOTE — OP NOTE
Postoperative Note    Linnea Villanueva  YOB: 1940  [id number]    Pre-operative Diagnosis: recurrent left breast cancer    Post-operative Diagnosis: Same    Procedure:  left full thickness superior breast partial mastectomy/chest wall excision,   left tissue rearrangement procedure for area of 35 sq. Cm. Anesthesia: General    Surgeons/Assistants: Ping Kevin  Assistant: Alexandra Hernandez    Estimated Blood Loss: less than 50     Drains: none    Complications: None apparent at conclusion of procedure    Specimens: left skin, subcutaneous tissue and underlying breast tissue (see below for details)    Findings: see below    Post-Op Condition: Stable    Disposition: to recovery room    Description of Procedure:   Ms. Gill Garza is a 67 y.o. woman with a recurrent left breast cancer. She has elected to proceed with left superior breast partial mastectomy/chest wall excision. The indications for the planned procedure, along with the potential benefits and risks which include but are not limited to the risk of anesthesia, bleeding, infection, possible failed operation, possible need for additional surgery pending final pathologic assessment, lymphedema, sensation changes, and unappealing cosmetics were reviewed. We reviewed the possibility of positive margins given her disease scenario. I reviewed omitting a sentinel node procedure which was discussed in detail and with her medical oncologist.  It was felt that this would not . Preoperative assessment showed a clinically negative axilla. All questions were answered and she agrees to proceed. Ms. Gill Garza was met by me in the preoperative area. The surgical site was identified. Consent was obtained. The appropriate breast imaging was reviewed. She was brought to the operating room and placed supine with her arms extended on boards. She was appropriately positioned and padded. Compression stockings were placed.   Appropriate

## 2019-05-14 NOTE — H&P
H&P Update    The patient's most recent H&P, office notes, breast imaging, and pathology were reviewed. Patient examined and laterality marked. There has been no changes. We will plan to proceed with a left chest wall excision.     Fareed Montanez

## 2019-05-14 NOTE — ANESTHESIA POSTPROCEDURE EVALUATION
[facility] Department of Anesthesiology  Post-Anesthesia Note       Name:  James Mcgill                                  Age:  67 y.o. MRN:  [id number]     Last Vitals & Oxygen Saturation: BP (!) 132/90   Pulse 86   Temp 97 °F (36.1 °C)   Resp 14   Ht 5' 1.5\" (1.562 m)   Wt 126 lb 14.4 oz (57.6 kg)   SpO2 97%   BMI 23.59 kg/m²   Patient Vitals for the past 4 hrs:   BP Temp Temp src Pulse Resp SpO2 Height Weight   05/14/19 1015 (!) 132/90 -- -- 86 14 97 % -- --   05/14/19 1000 (!) 140/81 -- -- 83 14 98 % -- --   05/14/19 0950 134/80 97 °F (36.1 °C) -- 91 12 97 % -- --   05/14/19 0945 132/83 -- -- 94 13 99 % -- --   05/14/19 0940 137/87 -- -- 103 14 97 % -- --   05/14/19 0937 138/88 97 °F (36.1 °C) -- 101 12 98 % -- --   05/14/19 0705 (!) 156/85 97.5 °F (36.4 °C) Temporal 70 14 99 % -- --   05/14/19 0659 -- -- -- -- -- -- 5' 1.5\" (1.562 m) 126 lb 14.4 oz (57.6 kg)       Level of consciousness:  Awake, alert to baseline    Respiratory: Respirations easy, no distress. Stable. Cardiovascular: Hemodynamically stable. Hydration: Adequate. PONV: Adequately managed. Post-op pain: Adequately controlled. Post-op assessment: Tolerated anesthetic well without complication. Complications:  None.     Johnny Rogers MD  May 14, 2019   10:37 AM

## 2019-05-14 NOTE — ANESTHESIA PRE PROCEDURE
[facility] Department of Anesthesiology  Pre-Anesthesia Evaluation/Consultation       Name:  Lesvia Ramos  : 1940  Age:  67 y.o. MRN:  [id number]  Date: 2019           Surgeon: Surgeon(s):  Rinaldo Bosworth, MD    Procedure: Procedure(s):  LEFT BREAST CHEST WALL/SKIN EXCISION     Allergies   Allergen Reactions    Other Itching     neosynephrine     Patient Active Problem List   Diagnosis    Personal history of breast cancer    HTN, goal below 150/90     Past Medical History:   Diagnosis Date    Breast cancer ([facility] [address]     Cataract     Chipped tooth     Right upper anterior/bonded in past    Glaucoma of left eye     Glaucoma of right eye     HTN, goal below 150/90     HTN, goal below 150/90     Osteoarthritis     Osteoporosis      Past Surgical History:   Procedure Laterality Date    AXILLARY SURGERY      Lymph nodes removed  \"several\"    BREAST BIOPSY  2011    LEFT BREAST BIOPSY WITH FROZEN SECTION AND MARGIN BIOPSIES    BREAST LUMPECTOMY      Left     BREAST SURGERY      breast biopsy    COLONOSCOPY      CYST REMOVAL      posterior torso    DILATION AND CURETTAGE OF UTERUS      miscarriage    KNEE SURGERY      PIN placement after AA; L    TUBAL LIGATION       Social History     Tobacco Use    Smoking status: Never Smoker    Smokeless tobacco: Never Used   Substance Use Topics    Alcohol use: Yes     Comment: 2 oz per night    Drug use: No     Medications  No current facility-administered medications on file prior to encounter.       Current Outpatient Medications on File Prior to Encounter   Medication Sig Dispense Refill    coenzyme Q-10 100 MG capsule Take 200 mg by mouth three times a week      losartan (COZAAR) 100 MG tablet TK 1 T PO QD  0    raloxifene (EVISTA) 60 MG tablet TAKE 1 TABLET DAILY 90 tablet 3    vitamin D (ERGOCALCIFEROL) 04546 UNITS CAPS capsule TAKE ONE CAPSULE BY MOUTH EVERY 4 WEEKS 12 capsule 0    Omega-3 Fatty Acids (FISH OIL PO) Take by mouth daily       guaifenesin (MUCINEX) 600 MG SR tablet Take 600 mg by mouth daily.  Cranberry (CRANBERRY FRUIT) 475 MG CAPS Take 1 tablet by mouth daily.  Calcium Citrate-Vitamin D 200-200 MG-UNIT TABS Take 2 tablets by mouth daily.  LYSINE 1 tablet by Does not apply route daily.  Simethicone 125 MG CAPS Take 2 tablets by mouth 2 times daily as needed.  folic acid (FOLVITE) 1 MG tablet Take 1 mg by mouth daily.  Magnesium 500 MG CAPS Take 1 tablet by mouth daily.  Glucosamine-Chondroitin 500-400 MG CAPS Take 1 tablet by mouth 2 times daily.  B Complex-Folic Acid (SUPER B COMPLEX MAXI) TABS Take 1 tablet by mouth daily.  latanoprost (XALATAN) 0.005 % ophthalmic solution Place 1 drop into both eyes nightly.  Blood Pressure Monitoring ([employer] BP MONITOR/WRIST) KENDELL Use to check blood pressure weekly. Rest 15-30 minutes prior to taking a reading.  1 Device 0     Current Facility-Administered Medications   Medication Dose Route Frequency Provider Last Rate Last Dose    0.9 % sodium chloride infusion   Intravenous Continuous Marthann Councilman, MD        sodium chloride flush 0.9 % injection 10 mL  10 mL Intravenous 2 times per day Marthann Councilman, MD        sodium chloride flush 0.9 % injection 10 mL  10 mL Intravenous PRN Marthann Councilman, MD        ceFAZolin (ANCEF) 2 g in dextrose 4 % 100 mL IVPB (premix)  2 g Intravenous On Call to [facility] MD        lactated ringers infusion   Intravenous Continuous Magalie Monroy MD        sodium chloride flush 0.9 % injection 10 mL  10 mL Intravenous 2 times per day Magalie Monroy MD        sodium chloride flush 0.9 % injection 10 mL  10 mL Intravenous PRN Magalie Monroy MD        lidocaine PF 1 % injection 0.1 mL  0.1 mL Intradermal Once PRN Magalie Monroy MD         Vital Signs (Current) Vitals:    19   BP: (!) 156/85   Pulse: 70   Resp: 14   Temp: 97.5 °F (36.4 °C)   SpO2: 99%     Vital Signs Statistics (for past 48 hrs)     Temp  Av.5 °F (36.4 °C)  Min: 97.5 °F (36.4 °C)   Min taken time: 19  Max: 97.5 °F (36.4 °C)   Max taken time: 19  Pulse  Av  Min: 79   Min taken time: 19  Max: 79   Max taken time: 19 07  Resp  Av  Min: 15   Min taken time: 19  Max: 14   Max taken time: 19  BP  Min: 156/85   Min taken time: 19  Max: 156/85   Max taken time: 19  SpO2  Av %  Min: 99 %   Min taken time: 19  Max: 99 %   Max taken time: 19    BP Readings from Last 3 Encounters:   19 (!) 156/85   19 (!) 147/90   19 (!) 143/90     BMI  Body mass index is 23.59 kg/m². Estimated body mass index is 23.59 kg/m² as calculated from the following:    Height as of this encounter: 5' 1.5\" (1.562 m). Weight as of this encounter: 126 lb 14.4 oz (57.6 kg).     CBC   Lab Results   Component Value Date    WBC 7.8 2019    RBC 4.58 2019    RBC 4.78 2017    HGB 13.9 2019    HCT 42.7 2019    MCV 93.2 2019    RDW 13.6 2019     2019     CMP    Lab Results   Component Value Date     2019    K 4.6 2019     2019    CO2 25 2019    BUN 15 2019    CREATININE 0.9 2019    GFRAA >60 2019    GFRAA 80 2011    AGRATIO 1.5 2014    LABGLOM >60 2019    GLUCOSE 105 2019    GLUCOSE 96 2017    PROT 6.6 2017    CALCIUM 9.0 2019    BILITOT 0.5 2017    ALKPHOS 40 2017    ALKPHOS 46 2014    AST 28 2017    ALT 20 2017     BMP    Lab Results   Component Value Date     2019    K 4.6 2019     2019    CO2 25 2019    BUN 15 2019    CREATININE 0.9 2019    CALCIUM 9.0 2019 GFRAA >60 04/30/2019    GFRAA 80 11/21/2011    LABGLOM >60 04/30/2019    GLUCOSE 105 04/30/2019    GLUCOSE 96 02/21/2017     POCGlucose  No results for input(s): GLUCOSE in the last 72 hours. Coags  No results found for: PROTIME, INR, APTT  HCG (If Applicable) No results found for: PREGTESTUR, PREGSERUM, HCG, HCGQUANT   ABGs No results found for: PHART, PO2ART, UTG8GLY, RHV5XIB, BEART, E2MVDOZZ   Type & Screen (If Applicable)  No results found for: LABABO, LABRH                         BMI: Wt Readings from Last 3 Encounters:       NPO Status:   Date of last liquid consumption: 05/13/19   Time of last liquid consumption: 2330   Date of last solid food consumption: 05/13/19      Time of last solid consumption: 1700       Anesthesia Evaluation  Patient summary reviewed no history of anesthetic complications:   Airway: Mallampati: III  TM distance: >3 FB   Neck ROM: full   Dental: normal exam         Pulmonary:Negative Pulmonary ROS and normal exam                               Cardiovascular:  Exercise tolerance: good (>4 METS),   (+) hypertension:,         Rhythm: regular  Rate: normal           Beta Blocker:  Not on Beta Blocker         Neuro/Psych:   Negative Neuro/Psych ROS              GI/Hepatic/Renal: Neg GI/Hepatic/Renal ROS            Endo/Other: Negative Endo/Other ROS   (+) malignancy/cancer (Brst ca Hx). Abdominal:           Vascular: negative vascular ROS. Anesthesia Plan      general     ASA 2       Induction: intravenous. MIPS: Postoperative opioids intended and Prophylactic antiemetics administered. Anesthetic plan and risks discussed with patient. Plan discussed with CRNA. This pre-anesthesia assessment may be used as a history and physical.    DOS STAFF ADDENDUM:    Pt seen and examined, chart reviewed (including anesthesia, drug and allergy history). No interval changes to history and physical examination.   Anesthetic

## 2019-05-16 ENCOUNTER — TELEPHONE (OUTPATIENT)
Dept: SURGERY | Age: 79
End: 2019-05-16

## 2019-05-16 NOTE — TELEPHONE ENCOUNTER
Patient called office to let us know her incision was a little swollen yesterday, no redness or pain or drainage. She took her shower today and it felt better. The swelling has gone down. She feels it is doing well. Will see her at her post-op appointment.

## 2019-05-20 NOTE — PROGRESS NOTES
PCP:  Medical Oncology: bobbi  Radiation:  Other:      history of left breast cancer, 2002          History of left breast cancer recurrence, 2011             Recurrence of left breast cancer with dermal involvement, 2019               Ms. Zelia Lennox is a 67y.o.-year-old woman who is now s/p left local resection/partial mastectomy for dermal involvement of her left breast cancer. She underwent this procedure on 5/14/2019 and tolerated it well. She is doing quite well postoperatively and her pain is continuing to improve. Originally she reported that she has had skin changes around her surgical scar for quite some time dating back to a few years following her last surgery. She does not think it's changed recently. When examined, it was decided to move forward with a skin biopsy. INTERVAL HISTORY:       She has declined adjuvant therapies in the past.  This included adjuvant radiation.      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 There were 0/3 lymph nodes involved with carcinoma. She declined adjuvant radiation or endocrine therapy that time.     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 Tumor size was 2.1 cm. ER positive SD positive HER-2 negative. She declined adjuvant therapy.     On 3/29/2019 she underwent bilateral screening mammography. There are no suspicious masses calcifications or distortions noted in either breast. There is a stable postsurgical scar. There is no mammographic evidence of malignancy. BI-RADS 2.     On 3/29/2019 she underwent left breast/chest wall punch biopsy. Pathology identified dermal involvement of invasive mammary carcinoma consistent with her history of adenoid cystic carcinoma. ER negative SD negative HER-2 negative.     On 5/14/2019 she underwent excision of her left breast cancer/dermal involvement of her chest wall. 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 ER negative MD negative HER-2 negative. Margins were negative. Dissection was carried to the chest wall. As per discussion with medical oncology justin evaluation was not performed. [id number]. Pathology:    Department of Pathology  FINAL SURGICAL PATHOLOGY REPORT  Patient Name: Mackenzie Oro               Accession No:  [id number]   Age Sex:   1940    79 Y / F       Location:      [facility]  Account No:   [de-identified]                  Collected:     2019  Med Rec No:    [id number]                 Received:      2019  Attend Phys: Toya JIMÉNEZ             Completed:     2019  Perform Phys: Toya JIMÉNEZ            FINAL DIAGNOSIS:       A. Left breast tissue, excision:       - Infiltrating carcinoma involving skin dermis, clinically         residual/recurrent; consistent with known history of adenoid         cystic carcinoma.       - Resection margins negative for carcinoma.       B. Left breast lateral margin, excision:       - Negative for carcinoma.       C. Left breast medial margin, excision:       - Negative for carcinoma. COMMENT:  [Part A: the specimen demonstrates a biphasic carcinoma  infiltrating skin dermis. No epidermal involvement is seen. The tumor is  0.9 cm to the closest posterior margin.   JINMI/JINMI        Exam:  General: no acute distress  Breast: There is a well healing scar on the  left breast.  There is no active drainage or signs of infection. There is no ecchymosis, apparent hematomas or significant seromas present. She has good range of motion with her arm.   Respiratory: respirations are non-labored and there is no audible distress  Cardiovascular: regular rate, extremities appear well perfused  Neurologic: alert, oriented      Assessment/Plan:  history of left breast cancer, 2002  ER/MD negative HER-2 positive  S/p partial mastectomy with sentinel lymph node biopsy     History of left breast cancer recurrence, 2011             ER/MD positive HER-2 negative  S/p partial mastectomy      Left breast recurrence, dermal  ER negative MD negative HER 2 negative      S/p excision of dermal metastasis    Her pathology results were reviewed with her in detail today. She was provided a copy of her pathology report for her records. She will have a follow up with medical oncology. We discussed radiation therapy. We will make a referral to discuss risks and benefits. At this time she can resume normal activity and wearing her regular bras. She should be fully healed in another 6 weeks at which time she can begin massage with lotion to soften scar tissue. I encouraged her to continue self breast evaluation. Follow up surveillance was discussed. Our plan at this time is to follow up with surgical breast oncology office in 3 months. All of the patient's questions were answered at this time, but she was encouraged to call the office with any further inquiries.

## 2019-05-30 ENCOUNTER — OFFICE VISIT (OUTPATIENT)
Dept: SURGERY | Age: 79
End: 2019-05-30

## 2019-05-30 VITALS
OXYGEN SATURATION: 96 % | BODY MASS INDEX: 23.92 KG/M2 | HEIGHT: 62 IN | WEIGHT: 130 LBS | DIASTOLIC BLOOD PRESSURE: 68 MMHG | RESPIRATION RATE: 14 BRPM | HEART RATE: 72 BPM | SYSTOLIC BLOOD PRESSURE: 120 MMHG

## 2019-05-30 DIAGNOSIS — C50.912 RECURRENT BREAST CANCER, LEFT (HCC): ICD-10-CM

## 2019-05-30 DIAGNOSIS — Z85.3 PERSONAL HISTORY OF BREAST CANCER: ICD-10-CM

## 2019-05-30 DIAGNOSIS — C50.912 MALIGNANT NEOPLASM OF LEFT FEMALE BREAST, UNSPECIFIED ESTROGEN RECEPTOR STATUS, UNSPECIFIED SITE OF BREAST (HCC): Primary | ICD-10-CM

## 2019-05-30 PROCEDURE — 99024 POSTOP FOLLOW-UP VISIT: CPT | Performed by: SURGERY

## 2019-06-27 ENCOUNTER — TELEPHONE (OUTPATIENT)
Dept: SURGERY | Age: 79
End: 2019-06-27

## 2019-06-27 NOTE — TELEPHONE ENCOUNTER
Patient called states there is a spot on Left Breast with a pocket infection. (Cream color with some blood)  Little red and swollen. Please call patient at [phone].

## 2019-06-27 NOTE — TELEPHONE ENCOUNTER
Spoke with patient. She stated, \"i'm not really concerned I just wanted you guys to know about it. \" Had a small area on her incision that had a spot on it. She \" wiped it off\" \" the strings are coming out too. \" I asked her what strings, did she mean the suture she stated yes.    She will come to office tomorrow for a nurse check for me to look at her incision at 11 AM.

## 2019-06-28 ENCOUNTER — NURSE ONLY (OUTPATIENT)
Dept: SURGERY | Age: 79
End: 2019-06-28

## 2019-06-28 VITALS — SYSTOLIC BLOOD PRESSURE: 161 MMHG | TEMPERATURE: 97.7 F | HEART RATE: 71 BPM | DIASTOLIC BLOOD PRESSURE: 87 MMHG

## 2019-06-28 DIAGNOSIS — Z85.3 PERSONAL HISTORY OF BREAST CANCER: ICD-10-CM

## 2019-06-28 DIAGNOSIS — Z51.89 VISIT FOR WOUND CHECK: Primary | ICD-10-CM

## 2019-06-28 RX ORDER — SULFAMETHOXAZOLE AND TRIMETHOPRIM 800; 160 MG/1; MG/1
1 TABLET ORAL 2 TIMES DAILY
Qty: 20 TABLET | Refills: 0 | Status: SHIPPED | OUTPATIENT
Start: 2019-06-28 | End: 2019-07-08

## 2019-07-11 ENCOUNTER — NURSE ONLY (OUTPATIENT)
Dept: SURGERY | Age: 79
End: 2019-07-11

## 2019-07-11 VITALS
HEIGHT: 62 IN | BODY MASS INDEX: 23.96 KG/M2 | SYSTOLIC BLOOD PRESSURE: 149 MMHG | RESPIRATION RATE: 18 BRPM | WEIGHT: 130.2 LBS | OXYGEN SATURATION: 99 % | TEMPERATURE: 97.5 F | HEART RATE: 75 BPM | DIASTOLIC BLOOD PRESSURE: 83 MMHG

## 2019-07-11 DIAGNOSIS — Z09 POSTOP CHECK: ICD-10-CM

## 2019-07-11 PROCEDURE — 99024 POSTOP FOLLOW-UP VISIT: CPT | Performed by: SURGERY

## 2019-07-30 ENCOUNTER — HOSPITAL ENCOUNTER (OUTPATIENT)
Dept: ULTRASOUND IMAGING | Age: 79
Discharge: HOME | End: 2019-07-30
Payer: MEDICARE

## 2019-07-30 ENCOUNTER — HOSPITAL ENCOUNTER (OUTPATIENT)
Dept: WOMENS IMAGING | Age: 79
Discharge: HOME | End: 2019-07-30
Payer: MEDICARE

## 2019-07-30 DIAGNOSIS — R92.8 ABNORMAL MAMMOGRAM: ICD-10-CM

## 2019-07-30 DIAGNOSIS — Z85.3 PERSONAL HISTORY OF BREAST CANCER: ICD-10-CM

## 2019-07-30 DIAGNOSIS — C50.912 MALIGNANT NEOPLASM OF LEFT FEMALE BREAST, UNSPECIFIED ESTROGEN RECEPTOR STATUS, UNSPECIFIED SITE OF BREAST (HCC): Primary | ICD-10-CM

## 2019-07-30 DIAGNOSIS — Z85.3 PERSONAL HISTORY OF BREAST CANCER: Primary | ICD-10-CM

## 2019-07-30 PROCEDURE — G0279 TOMOSYNTHESIS, MAMMO: HCPCS

## 2019-07-30 PROCEDURE — 76642 ULTRASOUND BREAST LIMITED: CPT

## 2019-08-01 ENCOUNTER — HOSPITAL ENCOUNTER (OUTPATIENT)
Dept: WOMENS IMAGING | Age: 79
Discharge: HOME | End: 2019-08-01
Payer: MEDICARE

## 2019-08-01 ENCOUNTER — HOSPITAL ENCOUNTER (OUTPATIENT)
Dept: ULTRASOUND IMAGING | Age: 79
Discharge: HOME | End: 2019-08-01
Payer: MEDICARE

## 2019-08-01 DIAGNOSIS — C50.912 MALIGNANT NEOPLASM OF LEFT FEMALE BREAST, UNSPECIFIED ESTROGEN RECEPTOR STATUS, UNSPECIFIED SITE OF BREAST (HCC): ICD-10-CM

## 2019-08-01 DIAGNOSIS — R92.8 ABNORMAL MAMMOGRAM: ICD-10-CM

## 2019-08-01 PROCEDURE — 88342 IMHCHEM/IMCYTCHM 1ST ANTB: CPT

## 2019-08-01 PROCEDURE — 77065 DX MAMMO INCL CAD UNI: CPT

## 2019-08-01 PROCEDURE — 88305 TISSUE EXAM BY PATHOLOGIST: CPT

## 2019-08-01 PROCEDURE — 2709999900 US BREAST BIOPSY W LOC DEVICE 1ST LESION LEFT

## 2019-08-01 PROCEDURE — 2500000003 HC RX 250 WO HCPCS: Performed by: SURGERY

## 2019-08-01 RX ORDER — LIDOCAINE HYDROCHLORIDE 10 MG/ML
5 INJECTION, SOLUTION EPIDURAL; INFILTRATION; INTRACAUDAL; PERINEURAL ONCE
Status: COMPLETED | OUTPATIENT
Start: 2019-08-01 | End: 2019-08-01

## 2019-08-01 RX ORDER — LIDOCAINE HYDROCHLORIDE AND EPINEPHRINE 10; 10 MG/ML; UG/ML
10 INJECTION, SOLUTION INFILTRATION; PERINEURAL ONCE
Status: COMPLETED | OUTPATIENT
Start: 2019-08-01 | End: 2019-08-01

## 2019-08-01 RX ADMIN — LIDOCAINE HYDROCHLORIDE AND EPINEPHRINE 10 ML: 10; 10 INJECTION, SOLUTION INFILTRATION; PERINEURAL at 09:20

## 2019-08-01 RX ADMIN — LIDOCAINE HYDROCHLORIDE 5 ML: 10 INJECTION, SOLUTION EPIDURAL; INFILTRATION; INTRACAUDAL; PERINEURAL at 09:15

## 2019-08-01 ASSESSMENT — PAIN SCALES - GENERAL: PAINLEVEL_OUTOF10: 1

## 2019-08-02 ENCOUNTER — FOLLOWUP TELEPHONE ENCOUNTER (OUTPATIENT)
Dept: WOMENS IMAGING | Age: 79
End: 2019-08-02

## 2019-08-05 ENCOUNTER — TELEPHONE (OUTPATIENT)
Dept: SURGERY | Age: 79
End: 2019-08-05

## 2019-08-05 DIAGNOSIS — Z85.3 PERSONAL HISTORY OF BREAST CANCER: Primary | ICD-10-CM

## 2019-08-05 NOTE — TELEPHONE ENCOUNTER
----- Message from Oliver Rincon MD sent at 8/5/2019  1:28 PM EDT -----  Lets please order her a left ultrasound for 6 months.   When do we have her on the schedule?    arnaldo

## 2019-08-12 NOTE — PROGRESS NOTES
non-labored and there is no audible distress  Cardiovascular: regular rate, extremities appear well perfused  Neurologic: alert, oriented        Assessment/Plan:  history of left breast cancer, 2002  ER/[place] negative HER-2 positive  S/p partial mastectomy with sentinel lymph node biopsy     History of left breast cancer recurrence, 2011             ER/[place] positive HER-2 negative  S/p partial mastectomy      Left breast recurrence, dermal  ER negative [place] negative HER 2 negative        S/p excision of dermal metastasis    There are no current signs of recurrence. Signs/symptoms of recurrence were reviewed. She verbalizes understanding that she should notify our office if she identifies any abnormalities on self evaluation as it may require further workup. We also discussed adjuvant radiation therapy. Whether or not it'll come the past she is interested in consultation. I will make her referral. Her main concerns are regarding consistency of the breast following radiation therapy. She also has concerns about the potential for needing a mastectomy if cancer recurs. She has recurred twice now and is more or less expecting it to happen again. We discussed radiation is utilized to reduce the risk of recurrence locally. I encouraged her to continue self breast evaluation. Follow up surveillance was discussed. Our plan at this time is to follow up with surgical breast oncology office in ~6 months for a clinical breast exam. Bilateral breast imaging will be due about a time as well. We will schedule this for her. All of the patient's questions were answered at this time however, she was encouraged to call the office with any further inquiries. Approximately 25 minutes of time were spent in this visit of which 50% or more of the time was related to coordination of care.

## 2019-08-19 ENCOUNTER — OFFICE VISIT (OUTPATIENT)
Dept: SURGERY | Age: 79
End: 2019-08-19
Payer: MEDICARE

## 2019-08-19 VITALS — WEIGHT: 129 LBS | SYSTOLIC BLOOD PRESSURE: 124 MMHG | BODY MASS INDEX: 23.59 KG/M2 | DIASTOLIC BLOOD PRESSURE: 71 MMHG

## 2019-08-19 DIAGNOSIS — Z85.3 ENCOUNTER FOR FOLLOW-UP SURVEILLANCE OF BREAST CANCER: Primary | ICD-10-CM

## 2019-08-19 DIAGNOSIS — Z08 ENCOUNTER FOR FOLLOW-UP SURVEILLANCE OF BREAST CANCER: Primary | ICD-10-CM

## 2019-08-19 DIAGNOSIS — C50.912 RECURRENT BREAST CANCER, LEFT (HCC): ICD-10-CM

## 2019-08-19 DIAGNOSIS — Z85.3 PERSONAL HISTORY OF BREAST CANCER: ICD-10-CM

## 2019-08-19 PROCEDURE — 1123F ACP DISCUSS/DSCN MKR DOCD: CPT | Performed by: SURGERY

## 2019-08-19 PROCEDURE — G8427 DOCREV CUR MEDS BY ELIG CLIN: HCPCS | Performed by: SURGERY

## 2019-08-19 PROCEDURE — G8420 CALC BMI NORM PARAMETERS: HCPCS | Performed by: SURGERY

## 2019-08-19 PROCEDURE — G8399 PT W/DXA RESULTS DOCUMENT: HCPCS | Performed by: SURGERY

## 2019-08-19 PROCEDURE — 4040F PNEUMOC VAC/ADMIN/RCVD: CPT | Performed by: SURGERY

## 2019-08-19 PROCEDURE — 1036F TOBACCO NON-USER: CPT | Performed by: SURGERY

## 2019-08-19 PROCEDURE — 99214 OFFICE O/P EST MOD 30 MIN: CPT | Performed by: SURGERY

## 2019-08-19 PROCEDURE — 1090F PRES/ABSN URINE INCON ASSESS: CPT | Performed by: SURGERY

## 2020-02-18 ENCOUNTER — HOSPITAL ENCOUNTER (OUTPATIENT)
Dept: ULTRASOUND IMAGING | Age: 80
Discharge: HOME | End: 2020-02-18
Payer: MEDICARE

## 2020-02-18 PROCEDURE — 76642 ULTRASOUND BREAST LIMITED: CPT

## 2020-03-02 NOTE — PROGRESS NOTES
PCP:  Medical Oncology: bobbi  Radiation: grass  Other:        history of left breast cancer, 2002          History of left breast cancer recurrence, 2011             Recurrence of left breast cancer with dermal involvement, 2019        Ms. Altagracia Gallegos is a 67y.o.-year-old woman who initially presented to me with  recurrent left breast cancer. Since her postoperative visit Ms. Altagracia Gallegos has been doing quite well. She has underwent adjuvant radiation. She has no new complaints today. INTERVAL HISTORY:  She has declined adjuvant therapies in the past.  This included adjuvant radiation.      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 ER negative ID negative HER-2 positive. There were 0/3 lymph nodes involved with carcinoma. She declined adjuvant radiation or endocrine therapy that time.     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 Tumor size was 2.1 cm. ER positive ID positive HER-2 negative. She declined adjuvant therapy.     On 3/29/2019 she underwent bilateral screening mammography. There are no suspicious masses calcifications or distortions noted in either breast. There is a stable postsurgical scar. There is no mammographic evidence of malignancy. BI-RADS 2.     On 3/29/2019 she underwent left breast/chest wall punch biopsy. Pathology identified dermal involvement of invasive mammary carcinoma consistent with her history of adenoid cystic carcinoma. ER negative ID negative HER-2 negative.     On 5/14/2019 she underwent excision of her left breast cancer/dermal involvement of her chest wall. 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 ER negative ID negative HER-2 negative. Margins were negative. Dissection was carried to the chest wall.  As per discussion with medical oncology justin evaluation was not performed. [id number]. On 7/30/2019 she underwent left breast imaging for a palpable concern in the left breast 11:00 position. There are 2 mixed echogenicity masses noted 11 and 12:00 position. These are favored to represent fat necrosis are given the history biopsy is recommended. BI-RADS 4. On 8/1/2019 she underwent core needle biopsy of the targeted area. Pathology identified fat necrosis with no sign of malignancy or atypia. [id number]    From 9/23/2019 to 10/18/2019 she underwent left whole breast radiation. On 3/9/2020 she underwent bilateral breast imaging. Postsurgical changes are noted in the left breast.  There are no new concerning findings suggestive of malignancy. BI-RADS 2. Exam:  Physical exam has been reviewed and updated  General: no acute distress  Breast:  The patient was examined in the upright and supine position. There is a well healed scar on the left breast. In the 11:00 position about the midpoint along her surgical incision and just inferior to her incision there is a palpable nodule consistent with the biopsy-proven fat necrosis. This is unchanged. There are expected  post surgical changes. She has good range of motion with her arm. Her contralateral breast shows no new masses or changes in breast contour. There were no skin changes of the breast or nipple areolar complex. There was no nipple inversion or discharge.    Respiratory: respirations are non-labored and there is no audible distress  Cardiovascular: regular rate, extremities appear well perfused  Neurologic: alert, oriented        Assessment/Plan:  history of left breast cancer, 2002  ER/[place] negative HER-2 positive  S/p partial mastectomy with sentinel lymph node biopsy     History of left breast cancer recurrence, 2011             ER/[place] positive HER-2 negative  S/p partial mastectomy      Left breast recurrence, dermal  ER negative [place] negative HER 2

## 2020-03-09 ENCOUNTER — HOSPITAL ENCOUNTER (OUTPATIENT)
Dept: WOMENS IMAGING | Age: 80
Discharge: HOME | End: 2020-03-09
Payer: MEDICARE

## 2020-03-09 ENCOUNTER — OFFICE VISIT (OUTPATIENT)
Dept: SURGERY | Age: 80
End: 2020-03-09
Payer: MEDICARE

## 2020-03-09 VITALS
WEIGHT: 136 LBS | DIASTOLIC BLOOD PRESSURE: 78 MMHG | HEART RATE: 73 BPM | SYSTOLIC BLOOD PRESSURE: 127 MMHG | OXYGEN SATURATION: 96 % | BODY MASS INDEX: 24.87 KG/M2

## 2020-03-09 PROCEDURE — 4040F PNEUMOC VAC/ADMIN/RCVD: CPT | Performed by: SURGERY

## 2020-03-09 PROCEDURE — 1123F ACP DISCUSS/DSCN MKR DOCD: CPT | Performed by: SURGERY

## 2020-03-09 PROCEDURE — G8484 FLU IMMUNIZE NO ADMIN: HCPCS | Performed by: SURGERY

## 2020-03-09 PROCEDURE — G8427 DOCREV CUR MEDS BY ELIG CLIN: HCPCS | Performed by: SURGERY

## 2020-03-09 PROCEDURE — 1090F PRES/ABSN URINE INCON ASSESS: CPT | Performed by: SURGERY

## 2020-03-09 PROCEDURE — 99213 OFFICE O/P EST LOW 20 MIN: CPT | Performed by: SURGERY

## 2020-03-09 PROCEDURE — G8399 PT W/DXA RESULTS DOCUMENT: HCPCS | Performed by: SURGERY

## 2020-03-09 PROCEDURE — G0279 TOMOSYNTHESIS, MAMMO: HCPCS

## 2020-03-09 PROCEDURE — G8420 CALC BMI NORM PARAMETERS: HCPCS | Performed by: SURGERY

## 2020-03-09 PROCEDURE — 1036F TOBACCO NON-USER: CPT | Performed by: SURGERY

## 2020-09-23 ENCOUNTER — OFFICE VISIT (OUTPATIENT)
Dept: SURGERY | Age: 80
End: 2020-09-23
Payer: MEDICARE

## 2020-09-23 ENCOUNTER — HOSPITAL ENCOUNTER (OUTPATIENT)
Dept: WOMENS IMAGING | Age: 80
Discharge: HOME | End: 2020-09-23
Payer: MEDICARE

## 2020-09-23 VITALS
SYSTOLIC BLOOD PRESSURE: 116 MMHG | OXYGEN SATURATION: 95 % | HEART RATE: 63 BPM | BODY MASS INDEX: 24.69 KG/M2 | DIASTOLIC BLOOD PRESSURE: 63 MMHG | WEIGHT: 135 LBS

## 2020-09-23 PROCEDURE — 1090F PRES/ABSN URINE INCON ASSESS: CPT | Performed by: NURSE PRACTITIONER

## 2020-09-23 PROCEDURE — G0279 TOMOSYNTHESIS, MAMMO: HCPCS

## 2020-09-23 PROCEDURE — G8420 CALC BMI NORM PARAMETERS: HCPCS | Performed by: NURSE PRACTITIONER

## 2020-09-23 PROCEDURE — 99213 OFFICE O/P EST LOW 20 MIN: CPT | Performed by: NURSE PRACTITIONER

## 2020-09-23 PROCEDURE — 1036F TOBACCO NON-USER: CPT | Performed by: NURSE PRACTITIONER

## 2020-09-23 PROCEDURE — 4040F PNEUMOC VAC/ADMIN/RCVD: CPT | Performed by: NURSE PRACTITIONER

## 2020-09-23 PROCEDURE — G8399 PT W/DXA RESULTS DOCUMENT: HCPCS | Performed by: NURSE PRACTITIONER

## 2020-09-23 PROCEDURE — 1123F ACP DISCUSS/DSCN MKR DOCD: CPT | Performed by: NURSE PRACTITIONER

## 2020-09-23 PROCEDURE — G8427 DOCREV CUR MEDS BY ELIG CLIN: HCPCS | Performed by: NURSE PRACTITIONER

## 2020-09-23 RX ORDER — VIT C/B6/B5/MAGNESIUM/HERB 173 50-5-6-5MG
1000 CAPSULE ORAL 2 TIMES DAILY
COMMUNITY

## 2020-09-23 NOTE — PROGRESS NOTES
Surgical Breast Oncology      Medical Oncologist:[facility]  Radiation Oncologist: grass      CC:  6 month [id number] for breast check and mammogram      HPI: Anam Frank is a 79 y.o. woman here for 6 month follow up for breast check secondary to personal history of left breast cancer. She is has a history of left breast cancer 2002, left breast cancer recurrence in 2011, and another recurrence of the left breast cancer with dermal involvement in 2019. She has no breast related concerns today. She states that she does not perform routine self breast evaluations and has not noticed any new abnormalities such as masses, skin changes, color changes, nipple discharge, or changes to the nipple-areolar complex. Unilateral left mammogram today reveals no concerning findings suggestive of malignancy. BI-RADS 2. INTERVAL HX:  She has declined adjuvant therapies in the past.  This included adjuvant radiation.      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 ER negative [place] negative HER-2 positive. There were 0/3 lymph nodes involved with carcinoma. She declined adjuvant radiation or endocrine therapy that time.     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 Tumor size was 2.1 cm. ER positive VA positive HER-2 negative. She declined adjuvant therapy.     On 3/29/2019 she underwent bilateral screening mammography. There are no suspicious masses calcifications or distortions noted in either breast. There is a stable postsurgical scar. There is no mammographic evidence of malignancy. BI-RADS 2.     On 3/29/2019 she underwent left breast/chest wall punch biopsy. Pathology identified dermal involvement of invasive mammary carcinoma consistent with her history of adenoid cystic carcinoma.  ER negative VA negative HER-2 negative.     On 5/14/2019 she underwent excision of her left breast cancer/dermal involvement of her chest wall. 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 ER negative ND negative HER-2 negative. Margins were negative. Dissection was carried to the chest wall. As per discussion with medical oncology justin evaluation was not performed. [id number].     On 7/30/2019 she underwent left breast imaging for a palpable concern in the left breast 11:00 position. There are 2 mixed echogenicity masses noted 11 and 12:00 position. These are favored to represent fat necrosis are given the history biopsy is recommended. BI-RADS 4.     On 8/1/2019 she underwent core needle biopsy of the targeted area. Pathology identified fat necrosis with no sign of malignancy or atypia. [id number]     From 9/23/2019 to 10/18/2019 she underwent left whole breast radiation.     On 3/9/2020 she underwent bilateral breast imaging. Postsurgical changes are noted in the left breast.  There are no new concerning findings suggestive of malignancy. BI-RADS 2. On 9/23/2020 she underwent left unilateral breast imaging. Stable postoperative changes noted in the upper outer left breast and axilla. No new concerning findings suggestive of malignancy.   BI-RADS 2.       Past Medical History:   Diagnosis Date    Breast cancer ([address] 2002    Cataract     Chipped tooth     Right upper anterior/bonded in past    Glaucoma of left eye     Glaucoma of right eye     HTN, goal below 150/90     HTN, goal below 150/90     Osteoarthritis     Osteoporosis        Past Surgical History:   Procedure Laterality Date    AXILLARY SURGERY      Lymph nodes removed  \"several\"    BREAST BIOPSY  12/02/2011    LEFT BREAST BIOPSY WITH FROZEN SECTION AND MARGIN BIOPSIES    BREAST LUMPECTOMY  2002/2011    Left     BREAST SURGERY      breast biopsy    BREAST SURGERY Left 5/14/2019    LEFT FULL THICKNESS SUPERIOR  BREAST PARTIAL MASTECTOMY/CHEST WALL EXCISION,LEFT TISSUE REARRANGEMENT PROCEDURE, FOR AREA OF 35 SQ. CM performed by Chauncey Prieto MD at [facility] [place] [facility]  2012    CYST REMOVAL      posterior torso    DILATION AND CURETTAGE OF UTERUS      miscarriage    KNEE SURGERY      PIN placement after AA; L    TUBAL LIGATION         Family History   Problem Relation Age of Onset    Osteoporosis Mother     Cirrhosis Father     Alcohol Abuse Father        Allergies as of 09/23/2020 - Review Complete 09/23/2020   Allergen Reaction Noted    Other Itching 12/02/2011       Social History     Tobacco Use    Smoking status: Never Smoker    Smokeless tobacco: Never Used   Substance Use Topics    Alcohol use: Yes     Comment: 2 oz per night    Drug use: No       Current Outpatient Medications on File Prior to Visit   Medication Sig Dispense Refill    Turmeric 500 MG CAPS Take 1,000 mg by mouth 2 times daily      coenzyme Q-10 100 MG capsule Take 200 mg by mouth three times a week      losartan (COZAAR) 100 MG tablet TK 1 T PO QD  0    raloxifene (EVISTA) 60 MG tablet TAKE 1 TABLET DAILY 90 tablet 3    vitamin D (ERGOCALCIFEROL) 94396 UNITS CAPS capsule TAKE ONE CAPSULE BY MOUTH EVERY 4 WEEKS 12 capsule 0    Blood Pressure Monitoring ([employer] BP MONITOR/WRIST) KENDELL Use to check blood pressure weekly. Rest 15-30 minutes prior to taking a reading. 1 Device 0    Omega-3 Fatty Acids (FISH OIL PO) Take by mouth daily       guaifenesin (MUCINEX) 600 MG SR tablet Take 600 mg by mouth daily.  Cranberry (CRANBERRY FRUIT) 475 MG CAPS Take 1 tablet by mouth daily.  Calcium Citrate-Vitamin D 200-200 MG-UNIT TABS Take 2 tablets by mouth daily.  LYSINE 1 tablet by Does not apply route daily.  Simethicone 125 MG CAPS Take 2 tablets by mouth 2 times daily as needed.  folic acid (FOLVITE) 1 MG tablet Take 1 mg by mouth daily.         Magnesium 500 MG CAPS Take 1 tablet by mouth daily.  Glucosamine-Chondroitin 500-400 MG CAPS Take 1 tablet by mouth 2 times daily.  B Complex-Folic Acid (SUPER B COMPLEX MAXI) TABS Take 1 tablet by mouth daily.  latanoprost (XALATAN) 0.005 % ophthalmic solution Place 1 drop into both eyes nightly. No current facility-administered medications on file prior to visit. Medications: documentation has been reviewed in the electronic medical record and patient office intake form. REVIEW OF SYSTEMS:  Constitutional: Negative for fever  HENT: Negative for sore throat  Eyes: Negative for redness   Respiratory: Negative for dyspnea, cough  Cardiovascular: Negative for chest pain  Gastrointestinal: Negative for vomiting, diarrhea   Genitourinary: Negative for hematuria   Musculoskeletal: Negative for arthralgias   Skin: Negative for rash  Neurological: Negative for syncope  Hematological: Negative for adenopathy  Psychiatric/Behavorial: Negative for anxiety         PHYSICAL EXAM:  /63   Pulse 63   Wt 135 lb (61.2 kg)   SpO2 95%   BMI 24.69 kg/m²   Constitutional: She appearswell-nourished. No apparent distress. Breast: The patient was examined in the upright and supine position. Breasts are symmetrically ptotic. Right: No new masses or changes in breast contour. No skin changes of the breast or nipple areolar complex. No nipple inversion or discharge. No erythema, thickening (peau d'orange), or dimpling. Left: Well-healed scar. In the 11 o'clock position about the midpoint along her surgical incision and just inferior to her incision there is a palpable nodule consistent with the biopsy-proven fat necrosis. This is unchanged compared to prior documentation. Expected postsurgical changes noted. No new masses or changes in breast contour. No skin changes of the breast or nipple areolar complex. No nipple inversion or discharge.  No erythema, thickening (peau d'orange), or dimpling. There is no axillary lymphadenopathy palpated bilaterally. Head: Normocephalic and atraumatic:   Eyes: EOM are normal. Pupils are equal, round, and reactive to light. Neck: Neck supple. No tracheal deviation present. No obvious mass. Cardiovascular: regular rate. Pulmonary: No accessory muscle use. Respirations non-labored and no wheezing. Lymphatics: No palpable supraclavicular, cervical, or axillary lymphadenopathy  Skin: No rash noted. No erythema. Neurologic: alert and oriented. Extremities: appear well perfused. No edema. No joint deformity             ASSESSMENT:  - Screening Breast Examination - stable breast examination and stable left unilateral screening mammogram.    - Personal History of Breast Cancer 2002 - ER/NV negative, HER-2 positive, s/p partial mastectomy with SLNB  - Personal history of breast cancer 2011 - ER/NV positive, HER-2 negative, s/p partial mastectomy  -Personal history of breast cancer 2019 - left breast recurrence, dermal, ER negative, NV negative, HER-2 negative, s/p excision of dermal metastasis, s/p adjuvant radiation therapy  - Encounter for follow-up surveillance of breast cancer       PLAN:    Bilateral diagnostic breast imaging due March 2021   Signs/symptoms of recurrence were reviewed. She verbalizes understanding that she should notify the office if she identifies any abnormalities on self evaluation.  Follow up cancer surveillance discussed    Discussed the importance of breast awareness including the importance and technique of self breast exams   Today's imaging was reviewed and the results were discussed with the patient, all questions answered   Education provided for Healthy Lifestyle Recommendations: healthy diet, routine exercise, weight control, decreased alcohol consumption.    Follow up after mammogram in March with Dr. Petrona Archer, APRN-CNP  [facility])   Surgical Breast Oncology   [phone]    All of the patient's questions were answered at this time however, she was encouraged to call the office with any further inquiries. Approximately 15 minutes of time were spent in this visit of which 50% or more of the time was related to coordination of care.

## 2021-03-16 NOTE — PROGRESS NOTES
with medical oncology justin evaluation was not performed. [id number]. On 7/30/2019 she underwent left breast imaging for a palpable concern in the left breast 11:00 position. There are 2 mixed echogenicity masses noted 11 and 12:00 position. These are favored to represent fat necrosis are given the history biopsy is recommended. BI-RADS 4. On 8/1/2019 she underwent core needle biopsy of the targeted area. Pathology identified fat necrosis with no sign of malignancy or atypia. [id number]    From 9/23/2019 to 10/18/2019 she underwent left whole breast radiation. On 3/9/2020 she underwent bilateral breast imaging. Postsurgical changes are noted in the left breast.  There are no new concerning findings suggestive of malignancy. BI-RADS 2. On 9/23/2020 she underwent left unilateral breast imaging. Stable postoperative changes noted in the upper outer left breast and axilla. No new concerning findings suggestive of malignancy. BI-RADS 2. On 3/22/2021 she underwent bilateral breast imaging. There are no new concerning findings suggestive of malignancy. BI-RADS 1. Exam:  Physical exam has been reviewed and updated  General: no acute distress  Breast:  The patient was examined in the upright and supine position. There is a well healed scar on the left breast. In the 11:00 position about the midpoint along her surgical incision and just inferior to her incision there is are two palpable nodules consistent with the biopsy-proven fat necrosis. This is unchanged (3/22/2021). There are expected  post surgical changes. She has good range of motion with her arm. Her contralateral breast shows no new masses or changes in breast contour. There were no skin changes of the breast or nipple areolar complex. There was no nipple inversion or discharge.    Respiratory: respirations are non-labored and there is no audible distress  Cardiovascular: regular rate, extremities appear well perfused  Neurologic: alert, oriented        Assessment/Plan:  history of left breast cancer, 2002  ER/[place] negative HER-2 positive  S/p partial mastectomy with sentinel lymph node biopsy     History of left breast cancer recurrence, 2011             ER/[place] positive HER-2 negative  S/p partial mastectomy      Left breast recurrence, dermal  ER negative [place] negative HER 2 negative  S/p excision of dermal metastasis  S/p XRT    We reviewed her most recent breast imaging and physical exam findings today. There are no current signs of recurrence. Signs/symptoms of recurrence were reviewed. She verbalizes understanding that she should notify our office if she identifies any abnormalities on self evaluation as it may require further workup. I encouraged her to continue self breast evaluation. Follow up surveillance was discussed. Given her history of negative breast imaging in the setting of disease I will follow up with clinical exam in the surgical breast oncology office in 6 months. Bilateral breast imaging will be due in 1 year ~ March 2022. All of the patient's questions were answered at this time however, she was encouraged to call the office with any further inquiries. Approximately 20 minutes of time were spent in preparation, direct patient contact, care coordination, documentation and activities otherwise related to this encounter.

## 2021-03-22 ENCOUNTER — HOSPITAL ENCOUNTER (OUTPATIENT)
Dept: WOMENS IMAGING | Age: 81
Discharge: HOME | End: 2021-03-22
Payer: MEDICARE

## 2021-03-22 ENCOUNTER — OFFICE VISIT (OUTPATIENT)
Dept: SURGERY | Age: 81
End: 2021-03-22
Payer: MEDICARE

## 2021-03-22 VITALS
OXYGEN SATURATION: 98 % | SYSTOLIC BLOOD PRESSURE: 128 MMHG | TEMPERATURE: 96.8 F | BODY MASS INDEX: 24.33 KG/M2 | WEIGHT: 133 LBS | HEART RATE: 74 BPM | DIASTOLIC BLOOD PRESSURE: 73 MMHG

## 2021-03-22 DIAGNOSIS — Z85.3 HISTORY OF BREAST CANCER: ICD-10-CM

## 2021-03-22 DIAGNOSIS — Z08 ENCOUNTER FOR FOLLOW-UP SURVEILLANCE OF BREAST CANCER: ICD-10-CM

## 2021-03-22 DIAGNOSIS — Z12.39 ENCOUNTER FOR SCREENING BREAST EXAMINATION: Primary | ICD-10-CM

## 2021-03-22 DIAGNOSIS — Z12.39 ENCOUNTER FOR SCREENING BREAST EXAMINATION: ICD-10-CM

## 2021-03-22 DIAGNOSIS — Z85.3 ENCOUNTER FOR FOLLOW-UP SURVEILLANCE OF BREAST CANCER: ICD-10-CM

## 2021-03-22 DIAGNOSIS — C50.912 RECURRENT BREAST CANCER, LEFT (HCC): ICD-10-CM

## 2021-03-22 DIAGNOSIS — Z85.3 PERSONAL HISTORY OF BREAST CANCER: ICD-10-CM

## 2021-03-22 PROCEDURE — 1090F PRES/ABSN URINE INCON ASSESS: CPT | Performed by: SURGERY

## 2021-03-22 PROCEDURE — G8484 FLU IMMUNIZE NO ADMIN: HCPCS | Performed by: SURGERY

## 2021-03-22 PROCEDURE — G8399 PT W/DXA RESULTS DOCUMENT: HCPCS | Performed by: SURGERY

## 2021-03-22 PROCEDURE — 4040F PNEUMOC VAC/ADMIN/RCVD: CPT | Performed by: SURGERY

## 2021-03-22 PROCEDURE — 1123F ACP DISCUSS/DSCN MKR DOCD: CPT | Performed by: SURGERY

## 2021-03-22 PROCEDURE — G0279 TOMOSYNTHESIS, MAMMO: HCPCS

## 2021-03-22 PROCEDURE — G8420 CALC BMI NORM PARAMETERS: HCPCS | Performed by: SURGERY

## 2021-03-22 PROCEDURE — 99213 OFFICE O/P EST LOW 20 MIN: CPT | Performed by: SURGERY

## 2021-03-22 PROCEDURE — G8427 DOCREV CUR MEDS BY ELIG CLIN: HCPCS | Performed by: SURGERY

## 2021-03-22 PROCEDURE — 1036F TOBACCO NON-USER: CPT | Performed by: SURGERY

## 2021-03-25 DIAGNOSIS — Z12.39 ENCOUNTER FOR SCREENING BREAST EXAMINATION AND DISCUSSION OF BREAST SELF EXAMINATION: Primary | ICD-10-CM

## 2021-03-25 DIAGNOSIS — Z12.31 ENCOUNTER FOR SCREENING MAMMOGRAM FOR MALIGNANT NEOPLASM OF BREAST: ICD-10-CM

## 2021-04-14 ENCOUNTER — IMMUNIZATION (OUTPATIENT)
Dept: PRIMARY CARE CLINIC | Age: 81
End: 2021-04-14
Payer: MEDICARE

## 2021-04-14 PROCEDURE — 91301 COVID-19, MODERNA VACCINE 100MCG/0.5ML DOSE: CPT | Performed by: FAMILY MEDICINE

## 2021-04-14 PROCEDURE — 0011A COVID-19, MODERNA VACCINE 100MCG/0.5ML DOSE: CPT | Performed by: FAMILY MEDICINE

## 2021-05-12 ENCOUNTER — IMMUNIZATION (OUTPATIENT)
Dept: PRIMARY CARE CLINIC | Age: 81
End: 2021-05-12
Payer: MEDICARE

## 2021-05-12 PROCEDURE — 0012A COVID-19, MODERNA VACCINE 100MCG/0.5ML DOSE: CPT | Performed by: FAMILY MEDICINE

## 2021-05-12 PROCEDURE — 91301 COVID-19, MODERNA VACCINE 100MCG/0.5ML DOSE: CPT | Performed by: FAMILY MEDICINE

## 2021-09-20 NOTE — PROGRESS NOTES
PCP:  Medical Oncology: [facility]  Radiation: grass  Other:        history of left breast cancer, 2002          History of left breast cancer recurrence, 2011             Recurrence of left breast cancer with dermal involvement, 2019        Ms. Elizabeth Ness is a [de-identified]y.o.-year-old woman who initially presented to me with  recurrent left breast cancer. Since her last encounter Ms. Elizabeth Ness has been doing quite well. She has underwent adjuvant radiation. She has no new complaints today. INTERVAL HISTORY:  She has declined adjuvant therapies in the past.  This included adjuvant radiation.      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 ER negative [place] negative HER-2 positive. There were 0/3 lymph nodes involved with carcinoma. She declined adjuvant radiation or endocrine therapy that time.     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 Tumor size was 2.1 cm. ER positive GA positive HER-2 negative. She declined adjuvant therapy.     On 3/29/2019 she underwent bilateral screening mammography. There are no suspicious masses calcifications or distortions noted in either breast. There is a stable postsurgical scar. There is no mammographic evidence of malignancy. BI-RADS 2.     On 3/29/2019 she underwent left breast/chest wall punch biopsy. Pathology identified dermal involvement of invasive mammary carcinoma consistent with her history of adenoid cystic carcinoma. ER negative GA negative HER-2 negative.     On 5/14/2019 she underwent excision of her left breast cancer/dermal involvement of her chest wall. 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 ER negative GA negative HER-2 negative. Margins were negative. Dissection was carried to the chest wall.  As per discussion with medical oncology justin evaluation was not performed. [id number]. On 7/30/2019 she underwent left breast imaging for a palpable concern in the left breast 11:00 position. There are 2 mixed echogenicity masses noted 11 and 12:00 position. These are favored to represent fat necrosis are given the history biopsy is recommended. BI-RADS 4. On 8/1/2019 she underwent core needle biopsy of the targeted area. Pathology identified fat necrosis with no sign of malignancy or atypia. [id number]    From 9/23/2019 to 10/18/2019 she underwent left whole breast radiation. On 3/9/2020 she underwent bilateral breast imaging. Postsurgical changes are noted in the left breast.  There are no new concerning findings suggestive of malignancy. BI-RADS 2. On 9/23/2020 she underwent left unilateral breast imaging. Stable postoperative changes noted in the upper outer left breast and axilla. No new concerning findings suggestive of malignancy. BI-RADS 2. On 3/22/2021 she underwent bilateral breast imaging. There are no new concerning findings suggestive of malignancy. BI-RADS 1. Exam:  Physical exam has been reviewed and updated  General: no acute distress  Breast:  The patient was examined in the upright and supine position. There is a well healed scar on the left breast. In the 11:00 position about the midpoint along her surgical incision and just inferior to her incision there is are two palpable nodules consistent with the biopsy-proven fat necrosis. This is unchanged 9/27/2021). There are expected  post surgical changes. She has good range of motion with her arm. Her contralateral breast shows no new masses or changes in breast contour. There were no skin changes of the breast or nipple areolar complex. There was no nipple inversion or discharge.    Respiratory: respirations are non-labored and there is no audible distress  Cardiovascular: regular rate, extremities appear well perfused  Neurologic: alert, oriented        Assessment/Plan:  history of left breast cancer, 2002  ER/[place] negative HER-2 positive  S/p partial mastectomy with sentinel lymph node biopsy     History of left breast cancer recurrence, 2011             ER/[place] positive HER-2 negative  S/p partial mastectomy      Left breast recurrence, dermal  ER negative [place] negative HER 2 negative  S/p excision of dermal metastasis  S/p XRT    We reviewed her most recent breast imaging and physical exam findings today. There are no current signs of recurrence. Signs/symptoms of recurrence were reviewed. She verbalizes understanding that she should notify our office if she identifies any abnormalities on self evaluation as it may require further workup. I encouraged her to continue self breast evaluation. Follow up surveillance was discussed. Given her history of negative breast imaging in the setting of disease I will follow up with clinical exam in the surgical breast oncology office in 6 months. Bilateral breast imaging will be due at that time as well ~ March 2022. If all within normal limits at that point we can plan annual follow-up. All of the patient's questions were answered at this time however, she was encouraged to call the office with any further inquiries. Approximately 20 minutes of time were spent in preparation, direct patient contact, care coordination, documentation and activities otherwise related to this encounter.

## 2021-09-27 ENCOUNTER — OFFICE VISIT (OUTPATIENT)
Dept: SURGERY | Age: 81
End: 2021-09-27
Payer: MEDICARE

## 2021-09-27 VITALS
BODY MASS INDEX: 21.95 KG/M2 | HEART RATE: 70 BPM | SYSTOLIC BLOOD PRESSURE: 116 MMHG | DIASTOLIC BLOOD PRESSURE: 60 MMHG | WEIGHT: 120 LBS | OXYGEN SATURATION: 98 %

## 2021-09-27 DIAGNOSIS — Z12.31 ENCOUNTER FOR SCREENING MAMMOGRAM FOR MALIGNANT NEOPLASM OF BREAST: ICD-10-CM

## 2021-09-27 DIAGNOSIS — Z85.3 PERSONAL HISTORY OF BREAST CANCER: ICD-10-CM

## 2021-09-27 DIAGNOSIS — Z85.3 ENCOUNTER FOR FOLLOW-UP SURVEILLANCE OF BREAST CANCER: Primary | ICD-10-CM

## 2021-09-27 DIAGNOSIS — Z85.3 ENCOUNTER FOR FOLLOW-UP SURVEILLANCE OF BREAST CANCER: ICD-10-CM

## 2021-09-27 DIAGNOSIS — Z85.3 PERSONAL HISTORY OF BREAST CANCER: Primary | ICD-10-CM

## 2021-09-27 DIAGNOSIS — Z08 ENCOUNTER FOR FOLLOW-UP SURVEILLANCE OF BREAST CANCER: ICD-10-CM

## 2021-09-27 DIAGNOSIS — Z85.3 HISTORY OF BREAST CANCER: ICD-10-CM

## 2021-09-27 DIAGNOSIS — Z12.39 ENCOUNTER FOR SCREENING BREAST EXAMINATION: ICD-10-CM

## 2021-09-27 DIAGNOSIS — Z08 ENCOUNTER FOR FOLLOW-UP SURVEILLANCE OF BREAST CANCER: Primary | ICD-10-CM

## 2021-09-27 PROCEDURE — G8427 DOCREV CUR MEDS BY ELIG CLIN: HCPCS | Performed by: SURGERY

## 2021-09-27 PROCEDURE — G8420 CALC BMI NORM PARAMETERS: HCPCS | Performed by: SURGERY

## 2021-09-27 PROCEDURE — 4040F PNEUMOC VAC/ADMIN/RCVD: CPT | Performed by: SURGERY

## 2021-09-27 PROCEDURE — 1123F ACP DISCUSS/DSCN MKR DOCD: CPT | Performed by: SURGERY

## 2021-09-27 PROCEDURE — 1090F PRES/ABSN URINE INCON ASSESS: CPT | Performed by: SURGERY

## 2021-09-27 PROCEDURE — G8399 PT W/DXA RESULTS DOCUMENT: HCPCS | Performed by: SURGERY

## 2021-09-27 PROCEDURE — 99213 OFFICE O/P EST LOW 20 MIN: CPT | Performed by: SURGERY

## 2021-09-27 PROCEDURE — 1036F TOBACCO NON-USER: CPT | Performed by: SURGERY

## 2022-01-19 ENCOUNTER — HOSPITAL ENCOUNTER (OUTPATIENT)
Dept: GENERAL RADIOLOGY | Age: 82
Discharge: HOME | End: 2022-01-19
Payer: MEDICARE

## 2022-01-19 DIAGNOSIS — M81.0 POSTMENOPAUSAL OSTEOPOROSIS: ICD-10-CM

## 2022-01-19 PROCEDURE — 77080 DXA BONE DENSITY AXIAL: CPT

## 2022-03-28 NOTE — PROGRESS NOTES
PCP:  Medical Oncology: bobbi  Radiation: grass  Other:        history of left breast cancer, 2002          History of left breast cancer recurrence, 2011             Recurrence of left breast cancer with dermal involvement, 2019        Ms. Bonita Ganser is a 81y.o.-year-old woman who initially presented to me with  recurrent left breast cancer. Since her last encounter Ms. Bonita Ganser has been doing quite well. She has underwent adjuvant radiation. She has no new complaints today. INTERVAL HISTORY:  She has declined adjuvant therapies in the past.  This included adjuvant radiation.      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 ER negative KS negative HER-2 positive. There were 0/3 lymph nodes involved with carcinoma. She declined adjuvant radiation or endocrine therapy that time.     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 Tumor size was 2.1 cm. ER positive KS positive HER-2 negative. She declined adjuvant therapy.     On 3/29/2019 she underwent bilateral screening mammography. There are no suspicious masses calcifications or distortions noted in either breast. There is a stable postsurgical scar. There is no mammographic evidence of malignancy. BI-RADS 2.     On 3/29/2019 she underwent left breast/chest wall punch biopsy. Pathology identified dermal involvement of invasive mammary carcinoma consistent with her history of adenoid cystic carcinoma. ER negative KS negative HER-2 negative.     On 5/14/2019 she underwent excision of her left breast cancer/dermal involvement of her chest wall. 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 ER negative KS negative HER-2 negative. Margins were negative. Dissection was carried to the chest wall.  As per discussion with medical oncology justin evaluation was not performed. [id number]. On 7/30/2019 she underwent left breast imaging for a palpable concern in the left breast 11:00 position. There are 2 mixed echogenicity masses noted 11 and 12:00 position. These are favored to represent fat necrosis are given the history biopsy is recommended. BI-RADS 4. On 8/1/2019 she underwent core needle biopsy of the targeted area. Pathology identified fat necrosis with no sign of malignancy or atypia. [id number]    From 9/23/2019 to 10/18/2019 she underwent left whole breast radiation. On 3/9/2020 she underwent bilateral breast imaging. Postsurgical changes are noted in the left breast.  There are no new concerning findings suggestive of malignancy. BI-RADS 2. On 9/23/2020 she underwent left unilateral breast imaging. Stable postoperative changes noted in the upper outer left breast and axilla. No new concerning findings suggestive of malignancy. BI-RADS 2. On 3/22/2021 she underwent bilateral breast imaging. There are no new concerning findings suggestive of malignancy. BI-RADS 1. On 3/31/2022 she underwent bilateral breast imaging. Stable posttreatment changes are noted in the left breast.  The right breast appears negative. There are no new concerning findings suggestive of malignancy in either breast.  BI-RADS 2. Exam:  Physical exam has been reviewed and updated  General: no acute distress  Breast:  The patient was examined in the upright and supine position. There is a well healed scar on the left breast. In the 11:00 position about the midpoint along her surgical incision and just inferior to her incision there is are two palpable nodules consistent with the biopsy-proven fat necrosis. This is unchanged (3/31/2022). There are expected  post surgical changes. She has good range of motion with her arm. Her contralateral breast shows no new masses or changes in breast contour.   There were no skin changes of the breast or nipple areolar complex. There was no nipple inversion or discharge. Respiratory: respirations are non-labored and there is no audible distress  Cardiovascular: regular rate, extremities appear well perfused  Neurologic: alert, oriented        Assessment/Plan:  history of left breast cancer, 2002  ER/TN negative HER-2 positive  S/p partial mastectomy with sentinel lymph node biopsy     History of left breast cancer recurrence, 2011             ER/TN positive HER-2 negative  S/p partial mastectomy      Left breast recurrence, dermal  ER negative TN negative HER 2 negative  S/p excision of dermal metastasis  S/p XRT    We reviewed her most recent breast imaging and physical exam findings today. There are no current signs of recurrence. Signs/symptoms of recurrence were reviewed. She verbalizes understanding that she should notify our office if she identifies any abnormalities on self evaluation as it may require further workup. I encouraged her to continue self breast evaluation. Follow up surveillance was discussed. We will plan to have her back in the surgical oncology office in 1 year for clinical exam and bilateral breast imaging. We discussed screening with our nurse practitioner. All of the patient's questions were answered at this time however, she was encouraged to call the office with any further inquiries. Approximately 20 minutes of time were spent in preparation, direct patient contact, care coordination, documentation and activities otherwise related to this encounter.

## 2022-03-31 ENCOUNTER — HOSPITAL ENCOUNTER (OUTPATIENT)
Dept: WOMENS IMAGING | Age: 82
Discharge: HOME | End: 2022-03-31
Payer: MEDICARE

## 2022-03-31 ENCOUNTER — OFFICE VISIT (OUTPATIENT)
Dept: SURGERY | Age: 82
End: 2022-03-31
Payer: MEDICARE

## 2022-03-31 VITALS
OXYGEN SATURATION: 95 % | BODY MASS INDEX: 20.14 KG/M2 | WEIGHT: 118 LBS | HEIGHT: 64 IN | RESPIRATION RATE: 18 BRPM | SYSTOLIC BLOOD PRESSURE: 135 MMHG | DIASTOLIC BLOOD PRESSURE: 84 MMHG | HEART RATE: 71 BPM

## 2022-03-31 VITALS — HEIGHT: 64 IN | BODY MASS INDEX: 20.32 KG/M2 | WEIGHT: 119 LBS

## 2022-03-31 DIAGNOSIS — Z85.3 ENCOUNTER FOR FOLLOW-UP SURVEILLANCE OF BREAST CANCER: Primary | ICD-10-CM

## 2022-03-31 DIAGNOSIS — Z85.3 PERSONAL HISTORY OF BREAST CANCER: ICD-10-CM

## 2022-03-31 DIAGNOSIS — Z08 ENCOUNTER FOR FOLLOW-UP SURVEILLANCE OF BREAST CANCER: Primary | ICD-10-CM

## 2022-03-31 DIAGNOSIS — Z12.31 ENCOUNTER FOR SCREENING MAMMOGRAM FOR MALIGNANT NEOPLASM OF BREAST: ICD-10-CM

## 2022-03-31 DIAGNOSIS — Z08 ENCOUNTER FOR FOLLOW-UP SURVEILLANCE OF BREAST CANCER: ICD-10-CM

## 2022-03-31 DIAGNOSIS — Z12.39 SCREENING BREAST EXAMINATION: ICD-10-CM

## 2022-03-31 DIAGNOSIS — Z12.31 SCREENING MAMMOGRAM, ENCOUNTER FOR: ICD-10-CM

## 2022-03-31 DIAGNOSIS — Z85.3 HISTORY OF BREAST CANCER: ICD-10-CM

## 2022-03-31 DIAGNOSIS — Z85.3 ENCOUNTER FOR FOLLOW-UP SURVEILLANCE OF BREAST CANCER: ICD-10-CM

## 2022-03-31 PROCEDURE — G8399 PT W/DXA RESULTS DOCUMENT: HCPCS | Performed by: SURGERY

## 2022-03-31 PROCEDURE — G8484 FLU IMMUNIZE NO ADMIN: HCPCS | Performed by: SURGERY

## 2022-03-31 PROCEDURE — 77063 BREAST TOMOSYNTHESIS BI: CPT

## 2022-03-31 PROCEDURE — 1090F PRES/ABSN URINE INCON ASSESS: CPT | Performed by: SURGERY

## 2022-03-31 PROCEDURE — G8420 CALC BMI NORM PARAMETERS: HCPCS | Performed by: SURGERY

## 2022-03-31 PROCEDURE — G8427 DOCREV CUR MEDS BY ELIG CLIN: HCPCS | Performed by: SURGERY

## 2022-03-31 PROCEDURE — 4040F PNEUMOC VAC/ADMIN/RCVD: CPT | Performed by: SURGERY

## 2022-03-31 PROCEDURE — 99213 OFFICE O/P EST LOW 20 MIN: CPT | Performed by: SURGERY

## 2022-03-31 PROCEDURE — 1123F ACP DISCUSS/DSCN MKR DOCD: CPT | Performed by: SURGERY

## 2022-03-31 PROCEDURE — 1036F TOBACCO NON-USER: CPT | Performed by: SURGERY

## 2022-03-31 RX ORDER — RALOXIFENE HYDROCHLORIDE 60 MG/1
TABLET, FILM COATED ORAL
COMMUNITY
Start: 2019-03-05

## 2022-04-04 DIAGNOSIS — Z08 ENCOUNTER FOR FOLLOW-UP SURVEILLANCE OF BREAST CANCER: Primary | ICD-10-CM

## 2022-04-04 DIAGNOSIS — Z85.3 ENCOUNTER FOR FOLLOW-UP SURVEILLANCE OF BREAST CANCER: Primary | ICD-10-CM

## 2022-04-04 DIAGNOSIS — Z85.3 PERSONAL HISTORY OF BREAST CANCER: ICD-10-CM

## 2022-04-04 DIAGNOSIS — Z12.31 ENCOUNTER FOR SCREENING MAMMOGRAM FOR MALIGNANT NEOPLASM OF BREAST: ICD-10-CM

## 2022-11-19 ENCOUNTER — OFFICE VISIT (OUTPATIENT)
Dept: ORTHOPEDIC SURGERY | Age: 82
End: 2022-11-19
Payer: MEDICARE

## 2022-11-19 VITALS — RESPIRATION RATE: 16 BRPM | HEIGHT: 64 IN | WEIGHT: 119 LBS | BODY MASS INDEX: 20.32 KG/M2

## 2022-11-19 DIAGNOSIS — M17.11 PRIMARY OSTEOARTHRITIS OF RIGHT KNEE: Primary | ICD-10-CM

## 2022-11-19 DIAGNOSIS — M25.561 RIGHT KNEE PAIN, UNSPECIFIED CHRONICITY: ICD-10-CM

## 2022-11-19 PROCEDURE — G8484 FLU IMMUNIZE NO ADMIN: HCPCS | Performed by: STUDENT IN AN ORGANIZED HEALTH CARE EDUCATION/TRAINING PROGRAM

## 2022-11-19 PROCEDURE — 1090F PRES/ABSN URINE INCON ASSESS: CPT | Performed by: STUDENT IN AN ORGANIZED HEALTH CARE EDUCATION/TRAINING PROGRAM

## 2022-11-19 PROCEDURE — G8420 CALC BMI NORM PARAMETERS: HCPCS | Performed by: STUDENT IN AN ORGANIZED HEALTH CARE EDUCATION/TRAINING PROGRAM

## 2022-11-19 PROCEDURE — G8399 PT W/DXA RESULTS DOCUMENT: HCPCS | Performed by: STUDENT IN AN ORGANIZED HEALTH CARE EDUCATION/TRAINING PROGRAM

## 2022-11-19 PROCEDURE — 99203 OFFICE O/P NEW LOW 30 MIN: CPT | Performed by: STUDENT IN AN ORGANIZED HEALTH CARE EDUCATION/TRAINING PROGRAM

## 2022-11-19 PROCEDURE — G8427 DOCREV CUR MEDS BY ELIG CLIN: HCPCS | Performed by: STUDENT IN AN ORGANIZED HEALTH CARE EDUCATION/TRAINING PROGRAM

## 2022-11-19 PROCEDURE — 1036F TOBACCO NON-USER: CPT | Performed by: STUDENT IN AN ORGANIZED HEALTH CARE EDUCATION/TRAINING PROGRAM

## 2022-11-19 PROCEDURE — 1123F ACP DISCUSS/DSCN MKR DOCD: CPT | Performed by: STUDENT IN AN ORGANIZED HEALTH CARE EDUCATION/TRAINING PROGRAM

## 2022-11-19 NOTE — PROGRESS NOTES
CHIEF COMPLAINT: Right knee pain. History:   Bette Schaumann is a 81 y.o. female self-referred to after hours for evaluation and treatment of right knee pain / injury. The patient complains of right knee pain. This is evaluated as a personal injury. The pain began several weeks ago. Rate pain 8/10. There was not a history of injury. She states her knee acts up when it gets cold. She is very active. The pain is located medial. It is bothersome with weight bearing. The knee has given out or felt unstable. The patient can bend and straighten the knee fully, but there is stiffness. There is no swelling in the knee. There was not catching / locking of the knee. The patient has not had PT. The patient has not had an injection. The patient has taken NSAIDs. The patient has tried ice. The patient's occupation is retired. Outside reports reviewed: none. Past Medical History:   Diagnosis Date    Breast cancer ([facility] [address] 2002    Cataract     Chipped tooth     Right upper anterior/bonded in past    Glaucoma of left eye     Glaucoma of right eye     HTN, goal below 150/90     HTN, goal below 150/90     Osteoarthritis     Osteoporosis        Past Surgical History:   Procedure Laterality Date    AXILLARY SURGERY      Lymph nodes removed  \"several\"    BREAST BIOPSY  12/02/2011    LEFT BREAST BIOPSY WITH FROZEN SECTION AND MARGIN BIOPSIES    BREAST LUMPECTOMY  2002/2011    Left     BREAST SURGERY      breast biopsy    BREAST SURGERY Left 5/14/2019    LEFT FULL THICKNESS SUPERIOR  BREAST PARTIAL MASTECTOMY/CHEST WALL EXCISION,LEFT TISSUE REARRANGEMENT PROCEDURE, FOR AREA OF 35 SQ.  CM performed by Cheri Garcia MD at [facility]    COLONOSCOPY  2012    CYST REMOVAL      posterior torso    DILATION AND CURETTAGE OF UTERUS      miscarriage    KNEE SURGERY      PIN placement after AA; L    TUBAL LIGATION         Family History   Problem Relation Age of Onset    Osteoporosis Mother     Breast Cancer Mother     Cirrhosis Father     Alcohol Abuse Father        Social History     Socioeconomic History    Marital status:      Spouse name: None    Number of children: None    Years of education: None    Highest education level: None   Occupational History    Occupation: stay at home   Tobacco Use    Smoking status: Never    Smokeless tobacco: Never   Vaping Use    Vaping Use: Never used   Substance and Sexual Activity    Alcohol use: Yes     Comment: 2 oz per night    Drug use: No    Sexual activity: Yes     Partners: Male       Current Outpatient Medications   Medication Sig Dispense Refill    raloxifene (EVISTA) 60 MG tablet 03/05/2019 Raloxifene Hydrochloride 60 MG Oral Tablet      03/05/2019  Active      Turmeric 500 MG CAPS Take 1,000 mg by mouth 2 times daily      coenzyme Q-10 100 MG capsule Take 200 mg by mouth three times a week      losartan (COZAAR) 100 MG tablet TK 1 T PO QD  0    raloxifene (EVISTA) 60 MG tablet TAKE 1 TABLET DAILY 90 tablet 3    vitamin D (ERGOCALCIFEROL) 09793 UNITS CAPS capsule TAKE ONE CAPSULE BY MOUTH EVERY 4 WEEKS 12 capsule 0    Blood Pressure Monitoring ([employer] BP MONITOR/WRIST) KENDELL Use to check blood pressure weekly. Rest 15-30 minutes prior to taking a reading. 1 Device 0    Omega-3 Fatty Acids (FISH OIL PO) Take by mouth daily       guaifenesin (MUCINEX) 600 MG SR tablet Take 600 mg by mouth daily. Cranberry (CRANBERRY FRUIT) 475 MG CAPS Take 1 tablet by mouth daily. Calcium Citrate-Vitamin D 200-200 MG-UNIT TABS Take 2 tablets by mouth daily. LYSINE 1 tablet by Does not apply route daily. Simethicone 125 MG CAPS Take 2 tablets by mouth 2 times daily as needed. folic acid (FOLVITE) 1 MG tablet Take 1 mg by mouth daily. Magnesium 500 MG CAPS Take 1 tablet by mouth daily. Glucosamine-Chondroitin 500-400 MG CAPS Take 1 tablet by mouth 2 times daily. B Complex-Folic Acid (SUPER B COMPLEX MAXI) TABS Take 1 tablet by mouth daily. latanoprost (XALATAN) 0.005 % ophthalmic solution Place 1 drop into both eyes nightly. No current facility-administered medications for this visit. Allergies   Allergen Reactions    Other Itching     neosynephrine       Review of Systems:  I have reviewed the clinically relevant past medical history, medications, allergies, family history, social history, and 13 point Review of Systems from the patient's recent history form & documented any details relevant to today's presenting complaints in the history above. The patient's self-reported past medical history, medications, allergies, family history, social history, and Review of Systems form from 11/19/22 have been scanned into the chart under the \"Media\" tab. Physical Examination:     Vital signs:     Vitals:    11/19/22 0954   Resp: 16        General:  alert, appears stated age, cooperative, and no distress   Gait:  Normal. The patient can bear weight on the injured extremity. Right Knee  Alignment:  neutral   ROM:  0 degrees extension to 120 degrees flexion   Left knee: 0 degrees extension, 120 flexion   Crepitus:  no   Joint Tenderness:  medial joint line   Effusion:   0 cc   Patellar excursion:  2 of 4 quadrants    Patellar tilt test:  negative   Patellar facet tenderness:  negative medial   negative lateral   Patellar apprehension test:  negative   Lachman test:  negative   Left knee: negative   Anterior drawer test:  negative   Left knee: negative   Posterior drawer:   negative   Left knee: negative   Varus laxity at 30 degrees:  negative   Left knee: negative   Valgus laxity at 30 degrees:   negative   Left knee: negative   Gavin's test:  negative   Left knee: negative     There is not any cellulitis, lymphedema or cutaneous lesions noted in the lower extremities. Motor exam of the lower extremities show quadriceps, hamstrings, foot dorsiflexion and plantarflexion grossly intact.  Sensation to both feet is grossly intact to light touch.  The bilateral lower extremities are warm and well-perfused with brisk capillary refill. Imaging:  Right Knee X-Ray: 3 views obtained and reviewed. No fracture or dislocation is present. The joint spaces is narrow at the medial compartment. The patella is well-centered within the trochlear groove. There are no loose bodies appreciated. Transversely oriented partially threaded screw across the proximal left tibial metaphysis. There is no soft tissue swelling present. Assessment:     Right knee osteoarthritis      Plan:     Natural history and expected course discussed. Questions answered. Discussed that she is a candidate for cortisone injection and can follow-up for this in the office next week. Continue supplementation we discussed including turmeric and glucosamine. Home exercise program provided. She will follow-up next week for a joint injection in the office. Jensen Carcamo PA-C  Board Certified by the [employer] on Certification of [address] and [address]: This note was generated with use of a verbal recognition program and an attempt was made to check for errors. It is possible that there are still dictated errors within this office note. If so, please bring any significant errors to my attention for an addendum. All efforts were made to ensure that this office note is accurate.

## 2022-11-22 ENCOUNTER — OFFICE VISIT (OUTPATIENT)
Dept: ORTHOPEDIC SURGERY | Age: 82
End: 2022-11-22
Payer: MEDICARE

## 2022-11-22 VITALS — HEIGHT: 64 IN | RESPIRATION RATE: 16 BRPM | WEIGHT: 117 LBS | BODY MASS INDEX: 19.97 KG/M2

## 2022-11-22 DIAGNOSIS — M17.11 PRIMARY OSTEOARTHRITIS OF RIGHT KNEE: Primary | ICD-10-CM

## 2022-11-22 PROCEDURE — 20610 DRAIN/INJ JOINT/BURSA W/O US: CPT | Performed by: STUDENT IN AN ORGANIZED HEALTH CARE EDUCATION/TRAINING PROGRAM

## 2022-11-22 PROCEDURE — 99999 PR OFFICE/OUTPT VISIT,PROCEDURE ONLY: CPT | Performed by: STUDENT IN AN ORGANIZED HEALTH CARE EDUCATION/TRAINING PROGRAM

## 2022-11-22 RX ORDER — TRIAMCINOLONE ACETONIDE 40 MG/ML
40 INJECTION, SUSPENSION INTRA-ARTICULAR; INTRAMUSCULAR ONCE
Status: COMPLETED | OUTPATIENT
Start: 2022-11-22 | End: 2022-11-22

## 2022-11-22 RX ORDER — LIDOCAINE HYDROCHLORIDE 10 MG/ML
4 INJECTION, SOLUTION INFILTRATION; PERINEURAL ONCE
Status: COMPLETED | OUTPATIENT
Start: 2022-11-22 | End: 2022-11-22

## 2022-11-22 RX ORDER — BUPIVACAINE HYDROCHLORIDE 2.5 MG/ML
4 INJECTION, SOLUTION INFILTRATION; PERINEURAL ONCE
Status: COMPLETED | OUTPATIENT
Start: 2022-11-22 | End: 2022-11-22

## 2022-11-22 RX ADMIN — BUPIVACAINE HYDROCHLORIDE 10 MG: 2.5 INJECTION, SOLUTION INFILTRATION; PERINEURAL at 09:56

## 2022-11-22 RX ADMIN — TRIAMCINOLONE ACETONIDE 40 MG: 40 INJECTION, SUSPENSION INTRA-ARTICULAR; INTRAMUSCULAR at 09:56

## 2022-11-22 RX ADMIN — LIDOCAINE HYDROCHLORIDE 4 ML: 10 INJECTION, SOLUTION INFILTRATION; PERINEURAL at 09:56

## 2022-11-22 NOTE — PROGRESS NOTES
PROCEDURE NOTE:    PRE-PROCEDURE DIAGNOSIS: Right  knee osteoarthritis     POST-PROCEDURE DIAGNOSIS: Right knee osteoarthritis       PROCEDURE:  The risks and benefits of an injection were discussed with the patient. The patient had full opportunity to ask questions and all were answered. The patient then provided verbal informed consent. The skin was then prepped with betadine solution and alcohol. Under aseptic conditions, the  right knee was injected with 4cc of 1% xylocaine, 4cc of 0.25% marcaine, and 1cc of Kenalog (40mg/ml). There were no immediate complications following the injection. The patient was advised of the possibility of injection site reaction and instructed to apply ice to the area and take NSAIDs if able. POST-PROCEDURE INSTRUCTIONS GIVEN TO PATIENT: The patient was advised to ice the knee for 15-20 minutes to relieve any injection site related pain. FOLLOW-UP: as directed.             Mildred Durán PA-C  Board Certified by the [employer] on Certification of [address] [place]

## 2023-04-25 ENCOUNTER — OFFICE VISIT (OUTPATIENT)
Dept: SURGERY | Age: 83
End: 2023-04-25
Payer: MEDICARE

## 2023-04-25 ENCOUNTER — HOSPITAL ENCOUNTER (OUTPATIENT)
Dept: WOMENS IMAGING | Age: 83
Discharge: HOME | End: 2023-04-25
Payer: MEDICARE

## 2023-04-25 VITALS
OXYGEN SATURATION: 98 % | RESPIRATION RATE: 18 BRPM | BODY MASS INDEX: 21.6 KG/M2 | HEIGHT: 61 IN | WEIGHT: 114.4 LBS | HEART RATE: 68 BPM

## 2023-04-25 VITALS — WEIGHT: 116 LBS | BODY MASS INDEX: 19.91 KG/M2

## 2023-04-25 DIAGNOSIS — Z12.31 ENCOUNTER FOR SCREENING MAMMOGRAM FOR MALIGNANT NEOPLASM OF BREAST: ICD-10-CM

## 2023-04-25 DIAGNOSIS — Z85.3 ENCOUNTER FOR FOLLOW-UP SURVEILLANCE OF BREAST CANCER: ICD-10-CM

## 2023-04-25 DIAGNOSIS — Z12.31 SCREENING MAMMOGRAM, ENCOUNTER FOR: ICD-10-CM

## 2023-04-25 DIAGNOSIS — Z08 ENCOUNTER FOR FOLLOW-UP SURVEILLANCE OF BREAST CANCER: ICD-10-CM

## 2023-04-25 DIAGNOSIS — Z85.3 PERSONAL HISTORY OF BREAST CANCER: Primary | ICD-10-CM

## 2023-04-25 DIAGNOSIS — Z12.39 SCREENING BREAST EXAMINATION: ICD-10-CM

## 2023-04-25 DIAGNOSIS — Z90.12 HISTORY OF PARTIAL MASTECTOMY OF LEFT BREAST: ICD-10-CM

## 2023-04-25 DIAGNOSIS — Z85.3 PERSONAL HISTORY OF BREAST CANCER: ICD-10-CM

## 2023-04-25 DIAGNOSIS — Z85.3 ENCOUNTER FOR FOLLOW-UP SURVEILLANCE OF BREAST CANCER: Primary | ICD-10-CM

## 2023-04-25 DIAGNOSIS — Z08 ENCOUNTER FOR FOLLOW-UP SURVEILLANCE OF BREAST CANCER: Primary | ICD-10-CM

## 2023-04-25 PROCEDURE — 99213 OFFICE O/P EST LOW 20 MIN: CPT | Performed by: NURSE PRACTITIONER

## 2023-04-25 PROCEDURE — 77063 BREAST TOMOSYNTHESIS BI: CPT

## 2023-04-25 PROCEDURE — 1123F ACP DISCUSS/DSCN MKR DOCD: CPT | Performed by: NURSE PRACTITIONER

## 2023-04-25 PROCEDURE — 1090F PRES/ABSN URINE INCON ASSESS: CPT | Performed by: NURSE PRACTITIONER

## 2023-04-25 PROCEDURE — G8427 DOCREV CUR MEDS BY ELIG CLIN: HCPCS | Performed by: NURSE PRACTITIONER

## 2023-04-25 PROCEDURE — G8399 PT W/DXA RESULTS DOCUMENT: HCPCS | Performed by: NURSE PRACTITIONER

## 2023-04-25 PROCEDURE — 1036F TOBACCO NON-USER: CPT | Performed by: NURSE PRACTITIONER

## 2023-04-25 PROCEDURE — G8420 CALC BMI NORM PARAMETERS: HCPCS | Performed by: NURSE PRACTITIONER

## 2023-04-25 NOTE — PATIENT INSTRUCTIONS
three levels of pressure to feel of all your breast tissue. Use light pressure to feel the tissue close to the skin surface. Use medium pressure to feel a little deeper. Use firm pressure to feel your tissue close to your breastbone and ribs. Use each pressure level to feel your breast tissue before moving on to the next spot. Check your entire breast, moving up and down as if following a strip from the collarbone to the bra line, and from the armpit to the ribs. Repeat until you have covered the entire breast.  Repeat this procedure for your left breast, using the pads of the 3 middle fingers of your right hand. To examine your breasts while in the shower:  Place one arm over your head and lightly soap your breast on that side. Using the pads of your fingers, gently move your hand over your breast (in the strip pattern described above), feeling carefully for any lumps or changes. Repeat for the other breast.  Have your doctor inspect anything you notice to see if you need further testing. Where can you learn more? Go to http://www.woods.com/ and enter P148 to learn more about \"Breast Self-Exam: Care Instructions. \"  Current as of: August 2, 2022               Content Version: 13.6  © 2006-2023 [employer]wise[employer]orporated. Care instructions adapted under license by [facility] ([facility]). If you have questions about a medical condition or this instruction, always ask your healthcare professional. [place] [address] any warranty or liability for your use of this information.

## 2023-04-25 NOTE — PROGRESS NOTES
negative HER-2 negative. On 5/14/2019 she underwent excision of her left breast cancer/dermal involvement of her chest wall. 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 ER negative PA negative HER-2 negative. Margins were negative. Dissection was carried to the chest wall. As per discussion with medical oncology justin evaluation was not performed. [id number]. On 7/30/2019 she underwent left breast imaging for a palpable concern in the left breast 11:00 position. There are 2 mixed echogenicity masses noted 11 and 12:00 position. These are favored to represent fat necrosis are given the history biopsy is recommended. BI-RADS 4. On 8/1/2019 she underwent core needle biopsy of the targeted area. Pathology identified fat necrosis with no sign of malignancy or atypia. [id number]     From 9/23/2019 to 10/18/2019 she underwent left whole breast radiation. On 3/9/2020 she underwent bilateral breast imaging. Postsurgical changes are noted in the left breast.  There are no new concerning findings suggestive of malignancy. BI-RADS 2. On 9/23/2020 she underwent left unilateral breast imaging. Stable postoperative changes noted in the upper outer left breast and axilla. No new concerning findings suggestive of malignancy. BI-RADS 2. On 3/22/2021 she underwent bilateral breast imaging. There are no new concerning findings suggestive of malignancy. BI-RADS 1. On 3/31/2022 she underwent bilateral breast imaging. Stable posttreatment changes are noted in the left breast.  The right breast appears negative. There are no new concerning findings suggestive of malignancy in either breast.  BI-RADS 2. On 4/25/2023 she underwent bilateral breast imaging. Stable posttreatment changes noted left breast.  No concerning findings suggest malignancy. BI-RADS 2.     Past Medical History:   Diagnosis Date    Breast

## 2023-10-31 ENCOUNTER — OFFICE VISIT (OUTPATIENT)
Dept: ORTHOPEDIC SURGERY | Age: 83
End: 2023-10-31
Payer: MEDICARE

## 2023-10-31 VITALS — HEIGHT: 61 IN | BODY MASS INDEX: 21.52 KG/M2 | WEIGHT: 114 LBS

## 2023-10-31 DIAGNOSIS — M75.121 NONTRAUMATIC COMPLETE TEAR OF RIGHT ROTATOR CUFF: ICD-10-CM

## 2023-10-31 DIAGNOSIS — M25.511 RIGHT SHOULDER PAIN, UNSPECIFIED CHRONICITY: Primary | ICD-10-CM

## 2023-10-31 PROCEDURE — G8484 FLU IMMUNIZE NO ADMIN: HCPCS | Performed by: INTERNAL MEDICINE

## 2023-10-31 PROCEDURE — 99204 OFFICE O/P NEW MOD 45 MIN: CPT | Performed by: INTERNAL MEDICINE

## 2023-10-31 PROCEDURE — 1090F PRES/ABSN URINE INCON ASSESS: CPT | Performed by: INTERNAL MEDICINE

## 2023-10-31 PROCEDURE — G8420 CALC BMI NORM PARAMETERS: HCPCS | Performed by: INTERNAL MEDICINE

## 2023-10-31 PROCEDURE — G8427 DOCREV CUR MEDS BY ELIG CLIN: HCPCS | Performed by: INTERNAL MEDICINE

## 2023-10-31 PROCEDURE — G8399 PT W/DXA RESULTS DOCUMENT: HCPCS | Performed by: INTERNAL MEDICINE

## 2023-10-31 PROCEDURE — 1036F TOBACCO NON-USER: CPT | Performed by: INTERNAL MEDICINE

## 2023-10-31 PROCEDURE — 1123F ACP DISCUSS/DSCN MKR DOCD: CPT | Performed by: INTERNAL MEDICINE

## 2023-10-31 RX ORDER — METHYLPREDNISOLONE 4 MG/1
TABLET ORAL
Qty: 1 KIT | Refills: 0 | Status: SHIPPED | OUTPATIENT
Start: 2023-10-31

## 2023-10-31 NOTE — PROGRESS NOTES
Chief Complaint:   Chief Complaint   Patient presents with    Shoulder Pain     Ongoing pain in shoulder for years. Has had multiple injuries to shoulder. Most recent injury happened 5 days ago due to fall. Pain was getting a little better, but she moved her arm wrong and pain has since came back. Only having pain with movement          History of Present Illness:       Patient is a 81 y.o. female presents with the above complaint. The symptoms began worsening 5 daysago and started with an injury. The patient describes a sharp, aching pain that does not radiate. The symptoms are intermittent  and are show no change since the onset. The symptoms do show a typical rotator cuff provacative pattern. The pain is  lateral about the shoulder. There is not associated mechanical symptoms localizing to the shoulder. The patient denies symptoms of instability about the shoulder. Treatment to date has included corticosteroid injection which was performed in the remote past .      Pain levels 3. The symptoms do not correlate with head and neck movement. The patient denies new onset weakness of the upper extremity. The patient does not have history of prior shoulder trauma. There is no history or autoimmune disease, inflammatory arthropathy or crystal arthropathy.           Past Medical History:        Past Medical History:   Diagnosis Date    Breast cancer [facility]) 2002 2011 & 2019    Cataract     Chipped tooth     Right upper anterior/bonded in past    Glaucoma of left eye     Glaucoma of right eye     History of therapeutic radiation     HTN, goal below 150/90     HTN, goal below 150/90     Osteoarthritis     Osteoporosis          Past Surgical History:   Procedure Laterality Date    AXILLARY SURGERY      Lymph nodes removed  \"several\"    BREAST BIOPSY  12/02/2011    LEFT BREAST BIOPSY WITH FROZEN SECTION AND MARGIN BIOPSIES    BREAST LUMPECTOMY  2002/2011    Left     BREAST SURGERY Left 05/14/2019    LEFT

## 2023-11-07 ENCOUNTER — OFFICE VISIT (OUTPATIENT)
Dept: ORTHOPEDIC SURGERY | Age: 83
End: 2023-11-07

## 2023-11-07 VITALS — BODY MASS INDEX: 20.33 KG/M2 | WEIGHT: 107.6 LBS

## 2023-11-07 DIAGNOSIS — S46.011S RUPTURE OF RIGHT SUPRASPINATUS TENDON, SEQUELA: ICD-10-CM

## 2023-11-07 DIAGNOSIS — M75.51 BURSITIS OF RIGHT SHOULDER: ICD-10-CM

## 2023-11-07 DIAGNOSIS — S46.019A: ICD-10-CM

## 2023-11-07 DIAGNOSIS — M75.121 NONTRAUMATIC COMPLETE TEAR OF RIGHT ROTATOR CUFF: Primary | ICD-10-CM

## 2023-11-07 NOTE — PROGRESS NOTES
impingement. Using virtual convex imaging, the posterior glenohumeral joint was visualized. There was no evidence of intra-articular effusion. The posterior labrum was identified but not well delineated on this particular examination. There was no evidence of intra-articular effusion. The acromial clavicular joint revealed low-grade degenerative change. There was no evidence of soft tissue mass or cystic lesions. Other Outside Imaging and Testing Personally Reviewed:    US EXTREMITY JOINT RIGHT NON VASC COMPLETE    Result Date: 11/7/2023  Radiology result is complete; follow up with provider / physician office for radiology results              Assessment   Impression: . Encounter Diagnoses   Name Primary? Nontraumatic complete tear of right rotator cuff Yes    Rupture of right supraspinatus tendon, sequela     Rupture of infraspinatus tendon     Bursitis of right shoulder         Right upper extremity pseudoparalysis-massive rotator cuff tear-complete full-thickness supraspinatus and infraspinatus tendons a      Plan:       Modification rotator cuff protocol  Ultrasound-guided subacromial steroid injection only for escalating pain levels  Clinical consultation with Dr. Estrellita Tamayo for consideration of rotator cuff repair         Orders:        Orders Placed This Encounter   Procedures    US EXTREMITY JOINT RIGHT NON VASC COMPLETE     Standing Status:   Future     Number of Occurrences:   1     Standing Expiration Date:   11/7/2024     Order Specific Question:   Reason for exam:     Answer:   aileen Maguire MD, Orthopedics and Sports Medicine (Shoulder; Elbow), [facility]     Referral Priority:   Routine     Referral Type:   Eval and Treat     Referral Reason:   Specialty Services Required     Referred to Provider:   Quinton Choudhury MD     Requested Specialty:   Orthopedic Surgery Sports Medicine     Number of Visits Requested:   [place],

## 2023-11-15 ENCOUNTER — OFFICE VISIT (OUTPATIENT)
Dept: ORTHOPEDIC SURGERY | Age: 83
End: 2023-11-15

## 2023-11-15 VITALS — WEIGHT: 107 LBS | HEIGHT: 61 IN | BODY MASS INDEX: 20.2 KG/M2

## 2023-11-15 DIAGNOSIS — S46.011A TRAUMATIC COMPLETE TEAR OF RIGHT ROTATOR CUFF, INITIAL ENCOUNTER: ICD-10-CM

## 2023-11-15 DIAGNOSIS — M25.511 RIGHT SHOULDER PAIN, UNSPECIFIED CHRONICITY: Primary | ICD-10-CM

## 2023-11-15 NOTE — H&P (VIEW-ONLY)
[facility]  History and Physical  Shoulder Pain    Date:  11/15/2023    Name:  Melisa Wolff  Address:  [address] [place] [address] [address] [address]    :  1940      Age:   81 y.o.    SSN:  [id number]      Medical Record Number:  [id number]    Reason for Visit:    Shoulder Pain (NP RIGHT SHOULDER)    HPI:   Melisa Wolff is a 81 y.o. female who presents to our office today for evaluation of her right shoulder. Patient has been referred here today by Dr. Anastasiya Hi due to a traumatic injury that occurred to her right shoulder approximately 2 weeks ago. Patient ports that she did have a mechanical fall which forced hyperabduction of her right arm. Since that time, she had progressive symptoms of difficulty with range of motion, pain, and weakness of her right shoulder. She was being treated conservatively by Dr. Anastasiya Hi including physical therapy as well as a Medrol Dosepak. More recently, she did undergo an ultrasound that showed a large rotator cuff tear involving the supraspinatus as well as the infraspinatus. She is now here today for referral for possible surgical intervention. She has not had any recent injections for her right shoulder does have a prior history of right shoulder bursitis which was treated with cortisone injection. The injection was much greater than 3 months ago. She is right-hand dominant at baseline. She denies any numbness or tingling, or any prior shoulder surgeries. She has a prior history of hypertension which is well controlled. She denies a history of diabetes and she does not take any blood thinners. Pain Assessment  Location of Pain: Shoulder  Location Modifiers: Right  Severity of Pain: 1  Quality of Pain: Dull  Frequency of Pain: Intermittent  Aggravating Factors:  Other (Comment)  Limiting Behavior: Some  Relieving Factors: Rest  Work-Related Injury: No  Are there other pain locations you

## 2023-11-15 NOTE — PROGRESS NOTES
[address]  History and Physical  Shoulder Pain    Date:  11/15/2023    Name:  John Griffith  Address:  [address] [place] [id number]    :  1940      Age:   81 y.o.    SSN:  [id number]      Medical Record Number:  [id number]    Reason for Visit:    Shoulder Pain (NP RIGHT SHOULDER)    HPI:   John Griffith is a 81 y.o. female who presents to our office today for evaluation of her right shoulder. Patient has been referred here today by Dr. Aleena Chisholm due to a traumatic injury that occurred to her right shoulder approximately 2 weeks ago. Patient ports that she did have a mechanical fall which forced hyperabduction of her right arm. Since that time, she had progressive symptoms of difficulty with range of motion, pain, and weakness of her right shoulder. She was being treated conservatively by Dr. Aleena Chisholm including physical therapy as well as a Medrol Dosepak. More recently, she did undergo an ultrasound that showed a large rotator cuff tear involving the supraspinatus as well as the infraspinatus. She is now here today for referral for possible surgical intervention. She has not had any recent injections for her right shoulder does have a prior history of right shoulder bursitis which was treated with cortisone injection. The injection was much greater than 3 months ago. She is right-hand dominant at baseline. She denies any numbness or tingling, or any prior shoulder surgeries. She has a prior history of hypertension which is well controlled. She denies a history of diabetes and she does not take any blood thinners. Pain Assessment  Location of Pain: Shoulder  Location Modifiers: Right  Severity of Pain: 1  Quality of Pain: Dull  Frequency of Pain: Intermittent  Aggravating Factors:  Other (Comment)  Limiting Behavior: Some  Relieving Factors: Rest  Work-Related Injury: No  Are there other pain locations you

## 2023-11-29 ENCOUNTER — OFFICE VISIT (OUTPATIENT)
Dept: ORTHOPEDIC SURGERY | Age: 83
End: 2023-11-29
Payer: MEDICARE

## 2023-11-29 VITALS — BODY MASS INDEX: 20.2 KG/M2 | WEIGHT: 107 LBS | HEIGHT: 61 IN

## 2023-11-29 DIAGNOSIS — M12.811 ROTATOR CUFF TEAR ARTHROPATHY, RIGHT: ICD-10-CM

## 2023-11-29 DIAGNOSIS — M25.511 RIGHT SHOULDER PAIN, UNSPECIFIED CHRONICITY: Primary | ICD-10-CM

## 2023-11-29 DIAGNOSIS — M75.101 ROTATOR CUFF TEAR ARTHROPATHY, RIGHT: ICD-10-CM

## 2023-11-29 PROCEDURE — 1090F PRES/ABSN URINE INCON ASSESS: CPT | Performed by: ORTHOPAEDIC SURGERY

## 2023-11-29 PROCEDURE — G8427 DOCREV CUR MEDS BY ELIG CLIN: HCPCS | Performed by: ORTHOPAEDIC SURGERY

## 2023-11-29 PROCEDURE — G8484 FLU IMMUNIZE NO ADMIN: HCPCS | Performed by: ORTHOPAEDIC SURGERY

## 2023-11-29 PROCEDURE — 1036F TOBACCO NON-USER: CPT | Performed by: ORTHOPAEDIC SURGERY

## 2023-11-29 PROCEDURE — 99214 OFFICE O/P EST MOD 30 MIN: CPT | Performed by: ORTHOPAEDIC SURGERY

## 2023-11-29 PROCEDURE — G8420 CALC BMI NORM PARAMETERS: HCPCS | Performed by: ORTHOPAEDIC SURGERY

## 2023-11-29 PROCEDURE — 1124F ACP DISCUSS-NO DSCNMKR DOCD: CPT | Performed by: ORTHOPAEDIC SURGERY

## 2023-11-29 PROCEDURE — G8399 PT W/DXA RESULTS DOCUMENT: HCPCS | Performed by: ORTHOPAEDIC SURGERY

## 2023-11-29 PROCEDURE — L3670 SO ACRO/CLAV CAN WEB PRE OTS: HCPCS | Performed by: ORTHOPAEDIC SURGERY

## 2023-11-29 NOTE — PROGRESS NOTES
throughout the supraspinatus musculature with moderate to severe changes as well as the infraspinatus seen on the sagittal projection. Assessment:  Lauren Fan is a 81 y.o. female with chronic full-thickness massive rotator cuff tear with fatty atrophy of the right shoulder. Patient presents for repeat assessment of her right shoulder today. Continues to have issues with pseudoparalysis secondary to a massive rotator cuff tear of her right shoulder with chronic muscle changes and fatty atrophy appreciable on MRI. We discussed with patient that at this point, her tear is not amenable to repair secondary to significant retraction as well as fatty atrophy of her supraspinatus and infraspinatus. Instead, we feel she is a surgical candidate for a reverse shoulder arthroplasty for her right shoulder due to her chronic rotator cuff changes. We do not feel that physical therapy will provide her any physical benefit preoperatively due to her significant tear pattern and chronic muscular changes. She is relatively healthy at baseline but will require formal medical clearance prior to surgery. No further imaging required at this time due to her lack of arthritic deformity. Will plan for surgical intervention in the next couple weeks depending on insurance authorization. All questions were answered at bedside today. We did also review expectations for rehabilitation, sling wear, and driving restrictions immediately postoperatively. She was agreeable to move forward despite these restrictions. Risks, benefits and potential complications of right reverse total shoulder arthroplasty surgery were discussed with the patient. Risks discussed include but are not limited to bleeding, infection, anesthetic risk, injury to nerves and blood vessels, deep vein thrombosis, residual stiffness and weakness, and the need for revision surgery.  The patient also understands that anesthetic risks include cardiopulmonary

## 2023-11-30 ENCOUNTER — TELEPHONE (OUTPATIENT)
Dept: ORTHOPEDIC SURGERY | Age: 83
End: 2023-11-30

## 2023-12-01 ENCOUNTER — TELEPHONE (OUTPATIENT)
Dept: ORTHOPEDIC SURGERY | Age: 83
End: 2023-12-01

## 2023-12-01 ENCOUNTER — HOSPITAL ENCOUNTER (OUTPATIENT)
Age: 83
Discharge: HOME | End: 2023-12-01
Payer: COMMERCIAL

## 2023-12-01 PROCEDURE — 87081 CULTURE SCREEN ONLY: CPT

## 2023-12-01 NOTE — TELEPHONE ENCOUNTER
Orthopedic Nurse Navigator Summary    Patient Name:  Cathlean Habermann  Anticipated Date of Surgery:  12/05/23  Attended Pre-op Education Class:  Video already sent to patient   PCP: Daquan Avery MD  Date of PCP visit for H&P: 12/01/23  Is patient in a Pain Management program:  Review of Medical history reveals history of: HTN, H/O Left breast cancer with mastectomy and radiation, Osteoporosis    Critical Lab Values  - Hemoglobin (g/dL):  Date: 12/01/23 Value 13.8  - Hematocrit(%): Date: 12/01/23  Value 41.1  - HgbA1C:  Date: 12/01/23  Value 5.7  - Albumin:  Date: 12/01/23  Value 4.2  - BUN:  Date: 12/01/23  Value 16  - Creatinine:  Date: 12/01/23  Value 0.69    12/01/23 MRSA swab- negative for MRSA, MSSA present    Coronary Artery Disease/HTN/CHF history: HTN  Cardiologist:  Cardiac clearance necessary:    Date of cardiac clearance appt:  Final Cardiac recommendations: On any anticoagulation: No    Diabetes History:  No  Most recent HgbA1C:  Pulmonary:  COPD/Emphysema/Use of home oxygen: No  Alcohol use: Yes    BMI greater than 40 at time of scheduling:  No  Additional medical concerns:   Additional recommendations for above concerns:  Attended Pre-Hab program:    Anticipated Discharge Disposition:  Home with OPT  Who will be with patient at home following discharge:    Equipment patient already has:  sling  Bedroom on first or second floor:  first  Bathroom on first or second floor:  first  Weight bearing status:  nwb VENANCIOE  Pre-op ambulatory status:  Number of entry steps:    Caregiver assistance:  full time    Guille Laura RN  Date:   12/01/23

## 2023-12-03 LAB — MRSA SPEC QL CULT: NORMAL

## 2023-12-04 ENCOUNTER — ANESTHESIA EVENT (OUTPATIENT)
Dept: OPERATING ROOM | Age: 83
DRG: 483 | End: 2023-12-04
Payer: MEDICARE

## 2023-12-04 LAB — MRSA SPEC QL CULT: NORMAL

## 2023-12-05 ENCOUNTER — ANESTHESIA (OUTPATIENT)
Dept: OPERATING ROOM | Age: 83
DRG: 483 | End: 2023-12-05
Payer: MEDICARE

## 2023-12-05 ENCOUNTER — HOSPITAL ENCOUNTER (INPATIENT)
Age: 83
LOS: 1 days | Discharge: HOME | DRG: 483 | End: 2023-12-06
Attending: ORTHOPAEDIC SURGERY | Admitting: ORTHOPAEDIC SURGERY
Payer: MEDICARE

## 2023-12-05 ENCOUNTER — APPOINTMENT (OUTPATIENT)
Dept: GENERAL RADIOLOGY | Age: 83
DRG: 483 | End: 2023-12-05
Attending: ORTHOPAEDIC SURGERY
Payer: MEDICARE

## 2023-12-05 DIAGNOSIS — M12.811 ROTATOR CUFF ARTHROPATHY OF RIGHT SHOULDER: Primary | ICD-10-CM

## 2023-12-05 PROBLEM — M12.812 ROTATOR CUFF ARTHROPATHY OF LEFT SHOULDER: Status: ACTIVE | Noted: 2023-12-05

## 2023-12-05 LAB
ABO + RH BLD: NORMAL
BLD GP AB SCN SERPL QL: NORMAL
BLD GP AB SCN SERPL QL: NORMAL

## 2023-12-05 PROCEDURE — 1200000000 HC SEMI PRIVATE

## 2023-12-05 PROCEDURE — 6360000002 HC RX W HCPCS: Performed by: ORTHOPAEDIC SURGERY

## 2023-12-05 PROCEDURE — 2580000003 HC RX 258: Performed by: STUDENT IN AN ORGANIZED HEALTH CARE EDUCATION/TRAINING PROGRAM

## 2023-12-05 PROCEDURE — 2720000010 HC SURG SUPPLY STERILE: Performed by: ORTHOPAEDIC SURGERY

## 2023-12-05 PROCEDURE — 3E0T3BZ INTRODUCTION OF ANESTHETIC AGENT INTO PERIPHERAL NERVES AND PLEXI, PERCUTANEOUS APPROACH: ICD-10-PCS | Performed by: ORTHOPAEDIC SURGERY

## 2023-12-05 PROCEDURE — 97530 THERAPEUTIC ACTIVITIES: CPT

## 2023-12-05 PROCEDURE — 94761 N-INVAS EAR/PLS OXIMETRY MLT: CPT

## 2023-12-05 PROCEDURE — 97162 PT EVAL MOD COMPLEX 30 MIN: CPT

## 2023-12-05 PROCEDURE — 7100000000 HC PACU RECOVERY - FIRST 15 MIN: Performed by: ORTHOPAEDIC SURGERY

## 2023-12-05 PROCEDURE — 3600000004 HC SURGERY LEVEL 4 BASE: Performed by: ORTHOPAEDIC SURGERY

## 2023-12-05 PROCEDURE — 6360000002 HC RX W HCPCS: Performed by: NURSE ANESTHETIST, CERTIFIED REGISTERED

## 2023-12-05 PROCEDURE — 6360000002 HC RX W HCPCS: Performed by: ANESTHESIOLOGY

## 2023-12-05 PROCEDURE — 6370000000 HC RX 637 (ALT 250 FOR IP): Performed by: STUDENT IN AN ORGANIZED HEALTH CARE EDUCATION/TRAINING PROGRAM

## 2023-12-05 PROCEDURE — 3600000014 HC SURGERY LEVEL 4 ADDTL 15MIN: Performed by: ORTHOPAEDIC SURGERY

## 2023-12-05 PROCEDURE — C1776 JOINT DEVICE (IMPLANTABLE): HCPCS | Performed by: ORTHOPAEDIC SURGERY

## 2023-12-05 PROCEDURE — 7100000001 HC PACU RECOVERY - ADDTL 15 MIN: Performed by: ORTHOPAEDIC SURGERY

## 2023-12-05 PROCEDURE — 2700000000 HC OXYGEN THERAPY PER DAY

## 2023-12-05 PROCEDURE — 2580000003 HC RX 258: Performed by: ORTHOPAEDIC SURGERY

## 2023-12-05 PROCEDURE — 2500000003 HC RX 250 WO HCPCS: Performed by: ANESTHESIOLOGY

## 2023-12-05 PROCEDURE — 64415 NJX AA&/STRD BRCH PLXS IMG: CPT | Performed by: ANESTHESIOLOGY

## 2023-12-05 PROCEDURE — 2709999900 HC NON-CHARGEABLE SUPPLY: Performed by: ORTHOPAEDIC SURGERY

## 2023-12-05 PROCEDURE — A4217 STERILE WATER/SALINE, 500 ML: HCPCS | Performed by: ORTHOPAEDIC SURGERY

## 2023-12-05 PROCEDURE — 3700000000 HC ANESTHESIA ATTENDED CARE: Performed by: ORTHOPAEDIC SURGERY

## 2023-12-05 PROCEDURE — 2580000003 HC RX 258: Performed by: ANESTHESIOLOGY

## 2023-12-05 PROCEDURE — 73020 X-RAY EXAM OF SHOULDER: CPT

## 2023-12-05 PROCEDURE — 6370000000 HC RX 637 (ALT 250 FOR IP): Performed by: ORTHOPAEDIC SURGERY

## 2023-12-05 PROCEDURE — C9290 INJ, BUPIVACAINE LIPOSOME: HCPCS | Performed by: ANESTHESIOLOGY

## 2023-12-05 PROCEDURE — 97116 GAIT TRAINING THERAPY: CPT

## 2023-12-05 PROCEDURE — 86901 BLOOD TYPING SEROLOGIC RH(D): CPT

## 2023-12-05 PROCEDURE — 97535 SELF CARE MNGMENT TRAINING: CPT

## 2023-12-05 PROCEDURE — 97166 OT EVAL MOD COMPLEX 45 MIN: CPT

## 2023-12-05 PROCEDURE — 3700000001 HC ADD 15 MINUTES (ANESTHESIA): Performed by: ORTHOPAEDIC SURGERY

## 2023-12-05 PROCEDURE — 86850 RBC ANTIBODY SCREEN: CPT

## 2023-12-05 PROCEDURE — 2580000003 HC RX 258: Performed by: NURSE ANESTHETIST, CERTIFIED REGISTERED

## 2023-12-05 PROCEDURE — 0RRJ00Z REPLACEMENT OF RIGHT SHOULDER JOINT WITH REVERSE BALL AND SOCKET SYNTHETIC SUBSTITUTE, OPEN APPROACH: ICD-10-PCS | Performed by: ORTHOPAEDIC SURGERY

## 2023-12-05 PROCEDURE — 86900 BLOOD TYPING SEROLOGIC ABO: CPT

## 2023-12-05 PROCEDURE — 2500000003 HC RX 250 WO HCPCS: Performed by: NURSE ANESTHETIST, CERTIFIED REGISTERED

## 2023-12-05 DEVICE — SCREW, LOCKING BONE, RSP, 5X18
Type: IMPLANTABLE DEVICE | Site: SHOULDER | Status: FUNCTIONAL
Brand: DJO SURGICAL

## 2023-12-05 DEVICE — RSP BASEPLATE, 30MM, W/P2 COATING
Type: IMPLANTABLE DEVICE | Site: SHOULDER | Status: FUNCTIONAL
Brand: DJO SURGICAL

## 2023-12-05 DEVICE — ALTIVATE REVERSE, HUMERAL STEM, SMALL SHELL, SZ 8X48MM
Type: IMPLANTABLE DEVICE | Site: SHOULDER | Status: FUNCTIONAL
Brand: DJO SURGICAL

## 2023-12-05 DEVICE — AR, SMALL SOCKET INSERT, 32MM NEUTRAL EPLUS
Type: IMPLANTABLE DEVICE | Site: SHOULDER | Status: FUNCTIONAL
Brand: DJO SURGICAL

## 2023-12-05 DEVICE — SCREW, LOCKING BONE, RSP, 5X26
Type: IMPLANTABLE DEVICE | Site: SHOULDER | Status: FUNCTIONAL
Brand: DJO SURGICAL

## 2023-12-05 DEVICE — GLENOID, HEAD W/RETAINING SCREW, RSP, 32MM/-4MM
Type: IMPLANTABLE DEVICE | Site: SHOULDER | Status: FUNCTIONAL
Brand: DJO SURGICAL

## 2023-12-05 DEVICE — IMPL CAPPED SHOULDER S3 TOTAL ADV REVERSE DJO: Type: IMPLANTABLE DEVICE | Site: SHOULDER | Status: FUNCTIONAL

## 2023-12-05 DEVICE — SCREW, LOCKING BONE, RSP, 5X22
Type: IMPLANTABLE DEVICE | Site: SHOULDER | Status: FUNCTIONAL
Brand: DJO SURGICAL

## 2023-12-05 RX ORDER — ACETAMINOPHEN 500 MG
1000 TABLET ORAL ONCE
Status: COMPLETED | OUTPATIENT
Start: 2023-12-05 | End: 2023-12-05

## 2023-12-05 RX ORDER — BUPIVACAINE HYDROCHLORIDE 5 MG/ML
INJECTION, SOLUTION EPIDURAL; INTRACAUDAL
Status: COMPLETED
Start: 2023-12-05 | End: 2023-12-05

## 2023-12-05 RX ORDER — PROCHLORPERAZINE EDISYLATE 5 MG/ML
5 INJECTION INTRAMUSCULAR; INTRAVENOUS
Status: DISCONTINUED | OUTPATIENT
Start: 2023-12-05 | End: 2023-12-05 | Stop reason: HOSPADM

## 2023-12-05 RX ORDER — SENNA AND DOCUSATE SODIUM 50; 8.6 MG/1; MG/1
1 TABLET, FILM COATED ORAL 2 TIMES DAILY
Status: DISCONTINUED | OUTPATIENT
Start: 2023-12-05 | End: 2023-12-06 | Stop reason: HOSPADM

## 2023-12-05 RX ORDER — SODIUM CHLORIDE 9 MG/ML
INJECTION, SOLUTION INTRAVENOUS PRN
Status: DISCONTINUED | OUTPATIENT
Start: 2023-12-05 | End: 2023-12-06 | Stop reason: HOSPADM

## 2023-12-05 RX ORDER — EPHEDRINE SULFATE 50 MG/ML
INJECTION INTRAVENOUS PRN
Status: DISCONTINUED | OUTPATIENT
Start: 2023-12-05 | End: 2023-12-05 | Stop reason: SDUPTHER

## 2023-12-05 RX ORDER — ONDANSETRON 4 MG/1
4 TABLET, ORALLY DISINTEGRATING ORAL EVERY 8 HOURS PRN
Status: DISCONTINUED | OUTPATIENT
Start: 2023-12-05 | End: 2023-12-06 | Stop reason: HOSPADM

## 2023-12-05 RX ORDER — SODIUM CHLORIDE 9 MG/ML
INJECTION, SOLUTION INTRAVENOUS PRN
Status: DISCONTINUED | OUTPATIENT
Start: 2023-12-05 | End: 2023-12-05 | Stop reason: HOSPADM

## 2023-12-05 RX ORDER — ONDANSETRON 2 MG/ML
4 INJECTION INTRAMUSCULAR; INTRAVENOUS
Status: DISCONTINUED | OUTPATIENT
Start: 2023-12-05 | End: 2023-12-05 | Stop reason: HOSPADM

## 2023-12-05 RX ORDER — ASPIRIN 325 MG
325 TABLET, DELAYED RELEASE (ENTERIC COATED) ORAL 2 TIMES DAILY
Status: DISCONTINUED | OUTPATIENT
Start: 2023-12-05 | End: 2023-12-06 | Stop reason: HOSPADM

## 2023-12-05 RX ORDER — ASPIRIN 325 MG
325 TABLET ORAL 2 TIMES DAILY
Qty: 28 TABLET | Refills: 0 | Status: SHIPPED | OUTPATIENT
Start: 2023-12-05 | End: 2023-12-19

## 2023-12-05 RX ORDER — ACETAMINOPHEN 325 MG/1
650 TABLET ORAL EVERY 6 HOURS
Status: DISCONTINUED | OUTPATIENT
Start: 2023-12-05 | End: 2023-12-06 | Stop reason: HOSPADM

## 2023-12-05 RX ORDER — IPRATROPIUM BROMIDE AND ALBUTEROL SULFATE 2.5; .5 MG/3ML; MG/3ML
1 SOLUTION RESPIRATORY (INHALATION)
Status: DISCONTINUED | OUTPATIENT
Start: 2023-12-05 | End: 2023-12-05 | Stop reason: HOSPADM

## 2023-12-05 RX ORDER — HYDROMORPHONE HYDROCHLORIDE 1 MG/ML
0.5 INJECTION, SOLUTION INTRAMUSCULAR; INTRAVENOUS; SUBCUTANEOUS EVERY 5 MIN PRN
Status: DISCONTINUED | OUTPATIENT
Start: 2023-12-05 | End: 2023-12-05 | Stop reason: HOSPADM

## 2023-12-05 RX ORDER — LABETALOL HYDROCHLORIDE 5 MG/ML
10 INJECTION, SOLUTION INTRAVENOUS
Status: DISCONTINUED | OUTPATIENT
Start: 2023-12-05 | End: 2023-12-05 | Stop reason: HOSPADM

## 2023-12-05 RX ORDER — DOXYCYCLINE 100 MG/1
100 CAPSULE ORAL EVERY 12 HOURS SCHEDULED
Status: DISCONTINUED | OUTPATIENT
Start: 2023-12-05 | End: 2023-12-06 | Stop reason: HOSPADM

## 2023-12-05 RX ORDER — SODIUM CHLORIDE 0.9 % (FLUSH) 0.9 %
5-40 SYRINGE (ML) INJECTION PRN
Status: DISCONTINUED | OUTPATIENT
Start: 2023-12-05 | End: 2023-12-05 | Stop reason: HOSPADM

## 2023-12-05 RX ORDER — OXYCODONE HYDROCHLORIDE AND ACETAMINOPHEN 5; 325 MG/1; MG/1
1 TABLET ORAL
Qty: 28 TABLET | Refills: 0 | Status: SHIPPED | OUTPATIENT
Start: 2023-12-05 | End: 2023-12-12

## 2023-12-05 RX ORDER — VANCOMYCIN HYDROCHLORIDE 1 G/20ML
INJECTION, POWDER, LYOPHILIZED, FOR SOLUTION INTRAVENOUS PRN
Status: DISCONTINUED | OUTPATIENT
Start: 2023-12-05 | End: 2023-12-05 | Stop reason: HOSPADM

## 2023-12-05 RX ORDER — PROPOFOL 10 MG/ML
INJECTION, EMULSION INTRAVENOUS PRN
Status: DISCONTINUED | OUTPATIENT
Start: 2023-12-05 | End: 2023-12-05 | Stop reason: SDUPTHER

## 2023-12-05 RX ORDER — FENTANYL CITRATE 50 UG/ML
25 INJECTION, SOLUTION INTRAMUSCULAR; INTRAVENOUS EVERY 5 MIN PRN
Status: DISCONTINUED | OUTPATIENT
Start: 2023-12-05 | End: 2023-12-05 | Stop reason: HOSPADM

## 2023-12-05 RX ORDER — SENNOSIDES 8.6 MG
1 TABLET ORAL DAILY
Qty: 20 TABLET | Refills: 0 | Status: SHIPPED | OUTPATIENT
Start: 2023-12-05

## 2023-12-05 RX ORDER — SODIUM CHLORIDE 0.9 % (FLUSH) 0.9 %
5-40 SYRINGE (ML) INJECTION PRN
Status: DISCONTINUED | OUTPATIENT
Start: 2023-12-05 | End: 2023-12-06 | Stop reason: HOSPADM

## 2023-12-05 RX ORDER — ONDANSETRON 2 MG/ML
INJECTION INTRAMUSCULAR; INTRAVENOUS PRN
Status: DISCONTINUED | OUTPATIENT
Start: 2023-12-05 | End: 2023-12-05 | Stop reason: SDUPTHER

## 2023-12-05 RX ORDER — OXYCODONE HYDROCHLORIDE 5 MG/1
5 TABLET ORAL EVERY 4 HOURS PRN
Status: DISCONTINUED | OUTPATIENT
Start: 2023-12-05 | End: 2023-12-06 | Stop reason: HOSPADM

## 2023-12-05 RX ORDER — GUAIFENESIN 600 MG/1
600 TABLET, EXTENDED RELEASE ORAL 2 TIMES DAILY
Status: DISCONTINUED | OUTPATIENT
Start: 2023-12-05 | End: 2023-12-06 | Stop reason: HOSPADM

## 2023-12-05 RX ORDER — FENTANYL CITRATE 50 UG/ML
INJECTION, SOLUTION INTRAMUSCULAR; INTRAVENOUS PRN
Status: DISCONTINUED | OUTPATIENT
Start: 2023-12-05 | End: 2023-12-05 | Stop reason: SDUPTHER

## 2023-12-05 RX ORDER — LOSARTAN POTASSIUM 100 MG/1
100 TABLET ORAL DAILY
Status: DISCONTINUED | OUTPATIENT
Start: 2023-12-06 | End: 2023-12-06 | Stop reason: HOSPADM

## 2023-12-05 RX ORDER — DOXYCYCLINE HYCLATE 100 MG
100 TABLET ORAL 2 TIMES DAILY
Qty: 10 TABLET | Refills: 0 | Status: SHIPPED | OUTPATIENT
Start: 2023-12-05 | End: 2023-12-10

## 2023-12-05 RX ORDER — LATANOPROST 50 UG/ML
1 SOLUTION/ DROPS OPHTHALMIC NIGHTLY
Status: DISCONTINUED | OUTPATIENT
Start: 2023-12-05 | End: 2023-12-06 | Stop reason: HOSPADM

## 2023-12-05 RX ORDER — SODIUM CHLORIDE 0.9 % (FLUSH) 0.9 %
5-40 SYRINGE (ML) INJECTION EVERY 12 HOURS SCHEDULED
Status: DISCONTINUED | OUTPATIENT
Start: 2023-12-05 | End: 2023-12-05 | Stop reason: HOSPADM

## 2023-12-05 RX ORDER — PREGABALIN 150 MG/1
150 CAPSULE ORAL ONCE
Status: COMPLETED | OUTPATIENT
Start: 2023-12-05 | End: 2023-12-05

## 2023-12-05 RX ORDER — ACETAMINOPHEN 325 MG/1
650 TABLET ORAL
Status: DISCONTINUED | OUTPATIENT
Start: 2023-12-05 | End: 2023-12-05 | Stop reason: HOSPADM

## 2023-12-05 RX ORDER — ONDANSETRON 4 MG/1
4 TABLET, FILM COATED ORAL 3 TIMES DAILY PRN
Qty: 20 TABLET | Refills: 0 | Status: SHIPPED | OUTPATIENT
Start: 2023-12-05

## 2023-12-05 RX ORDER — SODIUM CHLORIDE, SODIUM LACTATE, POTASSIUM CHLORIDE, CALCIUM CHLORIDE 600; 310; 30; 20 MG/100ML; MG/100ML; MG/100ML; MG/100ML
INJECTION, SOLUTION INTRAVENOUS CONTINUOUS
Status: DISCONTINUED | OUTPATIENT
Start: 2023-12-05 | End: 2023-12-05 | Stop reason: HOSPADM

## 2023-12-05 RX ORDER — CELECOXIB 200 MG/1
400 CAPSULE ORAL ONCE
Status: COMPLETED | OUTPATIENT
Start: 2023-12-05 | End: 2023-12-05

## 2023-12-05 RX ORDER — ROCURONIUM BROMIDE 10 MG/ML
INJECTION, SOLUTION INTRAVENOUS PRN
Status: DISCONTINUED | OUTPATIENT
Start: 2023-12-05 | End: 2023-12-05 | Stop reason: SDUPTHER

## 2023-12-05 RX ORDER — ONDANSETRON 2 MG/ML
4 INJECTION INTRAMUSCULAR; INTRAVENOUS EVERY 6 HOURS PRN
Status: DISCONTINUED | OUTPATIENT
Start: 2023-12-05 | End: 2023-12-06 | Stop reason: HOSPADM

## 2023-12-05 RX ORDER — DEXAMETHASONE SODIUM PHOSPHATE 4 MG/ML
INJECTION, SOLUTION INTRA-ARTICULAR; INTRALESIONAL; INTRAMUSCULAR; INTRAVENOUS; SOFT TISSUE PRN
Status: DISCONTINUED | OUTPATIENT
Start: 2023-12-05 | End: 2023-12-05 | Stop reason: SDUPTHER

## 2023-12-05 RX ORDER — SODIUM CHLORIDE 0.9 % (FLUSH) 0.9 %
5-40 SYRINGE (ML) INJECTION EVERY 12 HOURS SCHEDULED
Status: DISCONTINUED | OUTPATIENT
Start: 2023-12-05 | End: 2023-12-06 | Stop reason: HOSPADM

## 2023-12-05 RX ORDER — BUPIVACAINE HYDROCHLORIDE 5 MG/ML
INJECTION, SOLUTION EPIDURAL; INTRACAUDAL
Status: COMPLETED | OUTPATIENT
Start: 2023-12-05 | End: 2023-12-05

## 2023-12-05 RX ORDER — FENTANYL CITRATE 50 UG/ML
INJECTION, SOLUTION INTRAMUSCULAR; INTRAVENOUS
Status: COMPLETED
Start: 2023-12-05 | End: 2023-12-05

## 2023-12-05 RX ORDER — SODIUM CHLORIDE, SODIUM LACTATE, POTASSIUM CHLORIDE, CALCIUM CHLORIDE 600; 310; 30; 20 MG/100ML; MG/100ML; MG/100ML; MG/100ML
INJECTION, SOLUTION INTRAVENOUS CONTINUOUS PRN
Status: DISCONTINUED | OUTPATIENT
Start: 2023-12-05 | End: 2023-12-05 | Stop reason: SDUPTHER

## 2023-12-05 RX ORDER — OXYCODONE HYDROCHLORIDE 15 MG/1
7.5 TABLET ORAL EVERY 4 HOURS PRN
Status: DISCONTINUED | OUTPATIENT
Start: 2023-12-05 | End: 2023-12-06 | Stop reason: HOSPADM

## 2023-12-05 RX ADMIN — EPHEDRINE SULFATE 5 MG: 50 INJECTION INTRAVENOUS at 10:28

## 2023-12-05 RX ADMIN — BUPIVACAINE HYDROCHLORIDE 10 ML: 5 INJECTION, SOLUTION EPIDURAL; INTRACAUDAL; PERINEURAL at 08:53

## 2023-12-05 RX ADMIN — SODIUM CHLORIDE 750 MG: 900 INJECTION, SOLUTION INTRAVENOUS at 10:25

## 2023-12-05 RX ADMIN — BUPIVACAINE 10 ML: 13.3 INJECTION, SUSPENSION, LIPOSOMAL INFILTRATION at 08:53

## 2023-12-05 RX ADMIN — CELECOXIB 400 MG: 200 CAPSULE ORAL at 08:46

## 2023-12-05 RX ADMIN — EPHEDRINE SULFATE 5 MG: 50 INJECTION INTRAVENOUS at 11:01

## 2023-12-05 RX ADMIN — EPHEDRINE SULFATE 5 MG: 50 INJECTION INTRAVENOUS at 11:22

## 2023-12-05 RX ADMIN — ONDANSETRON 4 MG: 2 INJECTION INTRAMUSCULAR; INTRAVENOUS at 10:29

## 2023-12-05 RX ADMIN — CEFTRIAXONE SODIUM 2000 MG: 2 INJECTION, POWDER, FOR SOLUTION INTRAMUSCULAR; INTRAVENOUS at 10:14

## 2023-12-05 RX ADMIN — ROCURONIUM BROMIDE 70 MG: 10 INJECTION, SOLUTION INTRAVENOUS at 10:05

## 2023-12-05 RX ADMIN — EPHEDRINE SULFATE 5 MG: 50 INJECTION INTRAVENOUS at 10:19

## 2023-12-05 RX ADMIN — EPHEDRINE SULFATE 5 MG: 50 INJECTION INTRAVENOUS at 10:15

## 2023-12-05 RX ADMIN — EPHEDRINE SULFATE 5 MG: 50 INJECTION INTRAVENOUS at 10:42

## 2023-12-05 RX ADMIN — SODIUM CHLORIDE, PRESERVATIVE FREE 10 ML: 5 INJECTION INTRAVENOUS at 20:49

## 2023-12-05 RX ADMIN — SODIUM CHLORIDE, POTASSIUM CHLORIDE, SODIUM LACTATE AND CALCIUM CHLORIDE: 600; 310; 30; 20 INJECTION, SOLUTION INTRAVENOUS at 08:46

## 2023-12-05 RX ADMIN — EPHEDRINE SULFATE 5 MG: 50 INJECTION INTRAVENOUS at 10:11

## 2023-12-05 RX ADMIN — GUAIFENESIN 600 MG: 600 TABLET ORAL at 20:45

## 2023-12-05 RX ADMIN — FENTANYL CITRATE 100 MCG: 50 INJECTION, SOLUTION INTRAMUSCULAR; INTRAVENOUS at 08:50

## 2023-12-05 RX ADMIN — PROPOFOL 80 MG: 10 INJECTION, EMULSION INTRAVENOUS at 10:05

## 2023-12-05 RX ADMIN — PREGABALIN 150 MG: 150 CAPSULE ORAL at 08:46

## 2023-12-05 RX ADMIN — LATANOPROST 1 DROP: 50 SOLUTION OPHTHALMIC at 20:45

## 2023-12-05 RX ADMIN — DEXAMETHASONE SODIUM PHOSPHATE 4 MG: 4 INJECTION, SOLUTION INTRAMUSCULAR; INTRAVENOUS at 10:22

## 2023-12-05 RX ADMIN — DOXYCYCLINE 100 MG: 100 CAPSULE ORAL at 20:45

## 2023-12-05 RX ADMIN — PROPOFOL 20 MG: 10 INJECTION, EMULSION INTRAVENOUS at 11:56

## 2023-12-05 RX ADMIN — EPHEDRINE SULFATE 5 MG: 50 INJECTION INTRAVENOUS at 10:35

## 2023-12-05 RX ADMIN — SODIUM CHLORIDE, SODIUM LACTATE, POTASSIUM CHLORIDE, AND CALCIUM CHLORIDE: .6; .31; .03; .02 INJECTION, SOLUTION INTRAVENOUS at 11:52

## 2023-12-05 RX ADMIN — ACETAMINOPHEN 1000 MG: 500 TABLET ORAL at 08:46

## 2023-12-05 RX ADMIN — ROCURONIUM BROMIDE 10 MG: 10 INJECTION, SOLUTION INTRAVENOUS at 11:11

## 2023-12-05 RX ADMIN — SODIUM CHLORIDE, SODIUM LACTATE, POTASSIUM CHLORIDE, AND CALCIUM CHLORIDE: .6; .31; .03; .02 INJECTION, SOLUTION INTRAVENOUS at 08:37

## 2023-12-05 RX ADMIN — SUGAMMADEX 100 MG: 100 INJECTION, SOLUTION INTRAVENOUS at 11:50

## 2023-12-05 RX ADMIN — FENTANYL CITRATE 100 MCG: 50 INJECTION, SOLUTION INTRAMUSCULAR; INTRAVENOUS at 10:03

## 2023-12-05 RX ADMIN — PROPOFOL 20 MG: 10 INJECTION, EMULSION INTRAVENOUS at 12:01

## 2023-12-05 RX ADMIN — DOCUSATE SODIUM 50 MG AND SENNOSIDES 8.6 MG 1 TABLET: 8.6; 5 TABLET, FILM COATED ORAL at 20:45

## 2023-12-05 ASSESSMENT — PAIN DESCRIPTION - LOCATION: LOCATION: ARM;SHOULDER

## 2023-12-05 ASSESSMENT — PAIN DESCRIPTION - ORIENTATION: ORIENTATION: RIGHT;OTHER (COMMENT)

## 2023-12-05 ASSESSMENT — PAIN SCALES - GENERAL
PAINLEVEL_OUTOF10: 1
PAINLEVEL_OUTOF10: 1
PAINLEVEL_OUTOF10: 0

## 2023-12-05 ASSESSMENT — PAIN DESCRIPTION - DIRECTION: RADIATING_TOWARDS: ARMPIT

## 2023-12-05 ASSESSMENT — PAIN DESCRIPTION - PAIN TYPE: TYPE: ACUTE PAIN;SURGICAL PAIN

## 2023-12-05 ASSESSMENT — PAIN DESCRIPTION - FREQUENCY: FREQUENCY: INTERMITTENT

## 2023-12-05 ASSESSMENT — PAIN DESCRIPTION - DESCRIPTORS: DESCRIPTORS: SORE;BURNING

## 2023-12-05 ASSESSMENT — PAIN DESCRIPTION - ONSET: ONSET: ON-GOING

## 2023-12-05 NOTE — FLOWSHEET NOTE
Patient's  and son allowed briefly to bedside for visit. Will leave hospital for the night . Will call with room assignment when available. Would qualify for transfer to room at this time but no room available. Will place in clinical discharge at this time and change VS to every hour and assessments to every 2 hours.

## 2023-12-05 NOTE — ANESTHESIA PROCEDURE NOTES
Peripheral Block    Patient location during procedure: pre-op  Reason for block: post-op pain management and at surgeon's request  Start time: 12/5/2023 8:53 AM  End time: 12/5/2023 8:57 AM  Staffing  Performed: anesthesiologist   Anesthesiologist: Tesfaye Workman MD  Performed by: Tesfaye Workman MD  Authorized by: Tesfaye Workman MD    Preanesthetic Checklist  Completed: patient identified, IV checked, site marked, risks and benefits discussed, surgical/procedural consents, equipment checked, pre-op evaluation, timeout performed, anesthesia consent given, oxygen available, monitors applied/VS acknowledged, fire risk safety assessment completed and verbalized and blood product R/B/A discussed and consented  Peripheral Block   Patient position: supine  Prep: ChloraPrep  Provider prep: mask and sterile gloves  Patient monitoring: cardiac monitor, continuous pulse ox, continuous capnometry, frequent blood pressure checks, IV access, oxygen and responsive to questions  Block type: Brachial plexus  Interscalene  Laterality: right  Injection technique: single-shot  Guidance: ultrasound guided    Needle   Needle type: insulated echogenic nerve stimulator needle   Needle gauge: 22 G  Needle localization: ultrasound guidance  Needle length: 8 cm  Assessment   Injection assessment: negative aspiration for heme, no paresthesia on injection, local visualized surrounding nerve on ultrasound, no intravascular symptoms and low pressure verified by pressure monitor  Paresthesia pain: none  Slow fractionated injection: yes  Hemodynamics: stable  Real-time US image taken/store: yes  Outcomes: uncomplicated and patient tolerated procedure well    Additional Notes  0.5% bupivacaine 10 ml + exparel 10 ml, mixed and injected in 2 ml increments with negative aspiration between. No complications.   Medications Administered  bupivacaine (MARCAINE) PF injection 0.5% - Perineural   10 mL - 12/5/2023 8:53:00 AM  bupivacaine liposome

## 2023-12-05 NOTE — PLAN OF CARE
Problem: Discharge Planning  Goal: Discharge to home or other facility with appropriate resources  Outcome: Progressing   Patient needs to work with PT/OT again before discharge. Problem: Safety - Adult  Goal: Free from fall injury  Outcome: Progressing   Patient has all standard fall precautions in place, call light and tray table.   Problem: Pain  Goal: Verbalizes/displays adequate comfort level or baseline comfort level  Outcome: Progressing   Pain medications are being managed by the Mar.

## 2023-12-05 NOTE — DISCHARGE INSTRUCTIONS
Dr. Wan Marshall Discharge Instructions    Take pain medication as directed. If taking narcotic pain medication, do not take tylenol with this as there is tylenol in percocet/norco. Do not operate machinery while taking narcotic pain medications  Non weight bearing to effected extremity  Maintain sling until follow up or therapy. OK to remove sling several times a day to move elbow and wrist, no shoulder motion unless directed by therapy or Dr. Melissa Freitas. Therapy as instructed. You should receive a call regarding therapy  Resume regular diet  May take down dressing in 72 hours and replace with a clean dressing  May shower after 72 hours. Avoid any submersion of operative extremity. Avoid any hot tubs, tub baths, pools, lakes, ocean ect. Avoid contact with dishwater or dirty water. Call immediately if any increasing redness or drainage, any fevers. Colace for constipation  zofran for nausea  Aspirin 325mg daily for next 14 days to decrease risk of blood clots    Dr. Pito Asencio and Domenico Arauz  Call [phone] for appointment     PERIPHERAL NERVE BLOCK INSTRUCTIONS     Please remember while having a nerve block you are at an increased risk for [address] were given a nerve block today from the anesthesiologist. Most nerve blocks last anywhere from 6-36 hours. You should start taking your pain medication before the block wears off or when you first begin feeling discomfort. It takes at least 30-60 minutes for a pain pill to take effect. Pain medications should be taken with food. Consider setting an alarm through the night to help manage your pain level so you do not wake up with too much pain. Pain medicines can cause more sedation and decrease your breathing so ONLY take as directed. If you have Sleep Apnea, you need to use your C-Pap machine.    What to expect after a nerve block:    Numbness, tingling- affected area feels heavy or asleep   Weakness or inability

## 2023-12-05 NOTE — ANESTHESIA PRE PROCEDURE
AM    GFRAA 80 11/21/2011 03:05 PM    AGRATIO 1.5 11/07/2014 06:45 AM    LABGLOM >60 10/06/2020 10:53 AM    GLUCOSE 52 10/06/2020 10:53 AM    GLUCOSE 96 02/21/2017 01:40 PM    PROT 6.1 10/06/2020 10:53 AM    PROT 6.6 02/21/2017 01:40 PM    CALCIUM 9.1 10/06/2020 10:53 AM    BILITOT 0.5 10/06/2020 10:53 AM    ALKPHOS 47 10/06/2020 10:53 AM    AST 21 10/06/2020 10:53 AM    ALT 13 10/06/2020 10:53 AM       POC Tests: No results for input(s): \"POCGLU\", \"POCNA\", \"POCK\", \"POCCL\", \"POCBUN\", \"POCHEMO\", \"POCHCT\" in the last 72 hours. Coags: No results found for: \"PROTIME\", \"INR\", \"APTT\"    HCG (If Applicable): No results found for: \"PREGTESTUR\", \"PREGSERUM\", \"HCG\", \"HCGQUANT\"     ABGs: No results found for: \"PHART\", \"PO2ART\", \"RZU1ITH\", \"CKZ0KBB\", \"BEART\", \"N7XZZMYU\"     Type & Screen (If Applicable):  No results found for: \"LABABO\", \"LABRH\"    Drug/Infectious Status (If Applicable):  No results found for: \"HIV\", \"HEPCAB\"    COVID-19 Screening (If Applicable): No results found for: \"COVID19\"        Anesthesia Evaluation  Patient summary reviewed and Nursing notes reviewed  Airway: Mallampati: II  TM distance: >3 FB   Neck ROM: full  Mouth opening: > = 3 FB   Dental: normal exam         Pulmonary: breath sounds clear to auscultation                             Cardiovascular:  Exercise tolerance: good (>4 METS)  (+) hypertension:        Rhythm: regular  Rate: normal                    Neuro/Psych:               GI/Hepatic/Renal:             Endo/Other:    (+) : arthritis:., malignancy/cancer. ROS comment: Breast CA Abdominal:             Vascular: Other Findings:         Anesthesia Plan      general and regional     ASA 3       Induction: intravenous. Anesthetic plan and risks discussed with patient. Use of blood products discussed with patient whom. Plan discussed with surgical team and CRNA.                 Lynne Onofre MD   12/5/2023

## 2023-12-05 NOTE — INTERVAL H&P NOTE
Update History & Physical    The patient's History and Physical of November 15, 2023 was reviewed with the patient and I examined the patient. There was no change. The surgical site was confirmed by the patient and me. Plan: The risks, benefits, expected outcome, and alternative to the recommended procedure have been discussed with the patient. Patient understands and wants to proceed with the procedure.      Electronically signed by Jurgen Mauro MD on 12/5/2023 at 8:41 AM

## 2023-12-05 NOTE — BRIEF OP NOTE
Brief Postoperative Note      Patient: Washington Lott  YOB: 1940  MRN: [id number]    Date of Procedure: 12/5/2023    Pre-Op Diagnosis Codes:     * Right shoulder pain, unspecified chronicity [M25.511]    Post-Op Diagnosis: Post-Op Diagnosis Codes:     * Right shoulder pain, unspecified chronicity [M25.511]       Procedure(s):  RIGHT REVERSE TOTAL SHOULDER REPLACEMENT, OPEN BICEPS TENODESIS.     Surgeon(s):  Mary Viramontes MD    Assistant:  Surgical Assistant: Madelin Hartley  Fellow: Michaela Hoffman MD    Anesthesia: General    Estimated Blood Loss (mL): 916    Complications: None    Specimens:   * No specimens in log *    Implants:  * No implants in log *      Drains: * No LDAs found *    Findings: RTC arthropathy with intact teres and subscap      Electronically signed by Michaela Hoffman MD on 12/5/2023 at 11:41 AM

## 2023-12-05 NOTE — ANESTHESIA POSTPROCEDURE EVALUATION
Department of Anesthesiology  Postprocedure Note    Patient: Aly Ayala  MRN: [id number]  YOB: 1940  Date of evaluation: 12/5/2023      Procedure Summary       Date: 12/05/23 Room / Location: [facility] OR 09 / The [id number] [facility]    Anesthesia Start: 5722 Anesthesia Stop: 1219    Procedure: RIGHT REVERSE TOTAL SHOULDER REPLACEMENT, OPEN BICEPS TENODESIS. (Right: Shoulder) Diagnosis:       Right shoulder pain, unspecified chronicity      (Right shoulder pain, unspecified chronicity [M25.511])    Surgeons: Macy Owen MD Responsible Provider: Shanna Olmos MD    Anesthesia Type: general, regional ASA Status: 3            Anesthesia Type: No value filed.     Ligia Phase I: Ligia Score: 8    Ligia Phase II:        Anesthesia Post Evaluation    Patient location during evaluation: PACU  Patient participation: complete - patient participated  Level of consciousness: awake  Pain score: 0  Nausea & Vomiting: no nausea and no vomiting  Complications: no  Cardiovascular status: blood pressure returned to baseline  Respiratory status: acceptable  Hydration status: euvolemic  Pain management: adequate

## 2023-12-05 NOTE — FLOWSHEET NOTE
Patient received from the OR to PACU #6 post RIGHT REVERSE TOTAL SHOULDER REPLACEMENT, OPEN BICEPS TENODESIS. - Right  of Dr. Karl Lakhani. Placed on PACU monitoring equipment. Report given per CRNA and OR RN. Per report, patient went into the OR at hypertensive baseline, had treatment for low BP intra op. Had pre op block. Had IV started in left foot in the OR, now infusing well. On arrival, patient is still sleepy from surgery but arouseable, wheeler, able to wiggle fingers of right UE, but endorses numbness, no complaints of pain or nausea.

## 2023-12-05 NOTE — FLOWSHEET NOTE
More wakeful, sitting up, taking ice chips and apple juice with no nausea. Call placed to patient's  and son for update. Made aware of that patient must pass PT/OT prior to discharge.

## 2023-12-05 NOTE — FLOWSHEET NOTE
Patient unable to pass PT/OT. Call placed to Dr. Isom Shone to inform of need for admit orders. Call placed to patient's family. Yahaira Hinkle

## 2023-12-05 NOTE — PLAN OF CARE
Consult received for medical management. Home medications reviewed and primary team orders reviewed chart reviewed. Discussed with RN. Still on oxygen continue to wean. Her losartan has been appropriately restarted. Mucinex ordered as per patient's request.  Full note to follow in a.m.

## 2023-12-06 ENCOUNTER — TELEPHONE (OUTPATIENT)
Dept: ORTHOPEDIC SURGERY | Age: 83
End: 2023-12-06

## 2023-12-06 VITALS
TEMPERATURE: 98.1 F | BODY MASS INDEX: 20.69 KG/M2 | RESPIRATION RATE: 16 BRPM | HEART RATE: 84 BPM | SYSTOLIC BLOOD PRESSURE: 133 MMHG | HEIGHT: 60 IN | WEIGHT: 105.4 LBS | DIASTOLIC BLOOD PRESSURE: 83 MMHG | OXYGEN SATURATION: 94 %

## 2023-12-06 PROCEDURE — 6370000000 HC RX 637 (ALT 250 FOR IP): Performed by: STUDENT IN AN ORGANIZED HEALTH CARE EDUCATION/TRAINING PROGRAM

## 2023-12-06 PROCEDURE — 97530 THERAPEUTIC ACTIVITIES: CPT

## 2023-12-06 PROCEDURE — 2580000003 HC RX 258: Performed by: STUDENT IN AN ORGANIZED HEALTH CARE EDUCATION/TRAINING PROGRAM

## 2023-12-06 PROCEDURE — 97535 SELF CARE MNGMENT TRAINING: CPT

## 2023-12-06 PROCEDURE — 97110 THERAPEUTIC EXERCISES: CPT

## 2023-12-06 PROCEDURE — 97116 GAIT TRAINING THERAPY: CPT

## 2023-12-06 RX ADMIN — GUAIFENESIN 600 MG: 600 TABLET ORAL at 08:21

## 2023-12-06 RX ADMIN — DOCUSATE SODIUM 50 MG AND SENNOSIDES 8.6 MG 1 TABLET: 8.6; 5 TABLET, FILM COATED ORAL at 08:21

## 2023-12-06 RX ADMIN — ACETAMINOPHEN 650 MG: 325 TABLET ORAL at 01:38

## 2023-12-06 RX ADMIN — DOXYCYCLINE 100 MG: 100 CAPSULE ORAL at 08:21

## 2023-12-06 RX ADMIN — SODIUM CHLORIDE, PRESERVATIVE FREE 10 ML: 5 INJECTION INTRAVENOUS at 08:22

## 2023-12-06 RX ADMIN — ASPIRIN 325 MG: 325 TABLET, COATED ORAL at 10:25

## 2023-12-06 RX ADMIN — ACETAMINOPHEN 650 MG: 325 TABLET ORAL at 08:21

## 2023-12-06 RX ADMIN — LOSARTAN POTASSIUM 100 MG: 100 TABLET ORAL at 08:22

## 2023-12-06 ASSESSMENT — PAIN SCALES - GENERAL
PAINLEVEL_OUTOF10: 3
PAINLEVEL_OUTOF10: 4

## 2023-12-06 ASSESSMENT — PAIN DESCRIPTION - PAIN TYPE: TYPE: ACUTE PAIN;SURGICAL PAIN

## 2023-12-06 ASSESSMENT — PAIN DESCRIPTION - ONSET: ONSET: PROGRESSIVE

## 2023-12-06 ASSESSMENT — PAIN DESCRIPTION - LOCATION: LOCATION: SHOULDER

## 2023-12-06 ASSESSMENT — PAIN DESCRIPTION - DESCRIPTORS: DESCRIPTORS: ACHING

## 2023-12-06 ASSESSMENT — PAIN DESCRIPTION - ORIENTATION: ORIENTATION: RIGHT

## 2023-12-06 ASSESSMENT — PAIN DESCRIPTION - FREQUENCY: FREQUENCY: INTERMITTENT

## 2023-12-06 ASSESSMENT — PAIN - FUNCTIONAL ASSESSMENT: PAIN_FUNCTIONAL_ASSESSMENT: PREVENTS OR INTERFERES SOME ACTIVE ACTIVITIES AND ADLS

## 2023-12-06 NOTE — TELEPHONE ENCOUNTER
Spoke with patient    Incision status: No drainage or redness    Edema/Swelling/Teds: She is having some swelling in her right hand and fingers. She is using ice. She is wearing bilateral ROBERTO hose. Pain level and status: No pain at this time. Her block is still active. She is starting to get some feeling back in her fingers but her thumb is still numb. We discussed using pain medication once the feeling starts to come back but she would like to try to take OTC Tylenol instead of narcotics. We discussed staying under 3000 mg of Tylenol per day. She did complain of a scratchy throat and she feels like she has mucous that is in her throat that needs to come up. We discussed the importance of using her incentive spirometer every hour she is awake. She is drinking plenty of fluids    Use of pain medications: Prescribed Percocet, she hasn't had to take any yet. Blood thinner: Aspirin 325 mg BID    Bowels: No BM since surgery. She is taking an OTC stool softener and drinking plenty of fluids. She is urinating without any issues. Home Care Agency active: No    Outpatient therapy: Hasn't started yet    Do you have all of your medications: Yes    Changes in medications: No    Instructed pt to call Nurse Navigator or surgeon's office with any questions or concerns.      Follow up appointments:    Future Appointments   Date Time Provider [address] [place] [address]   12/13/2023  2:30 PM Kaye Martinez [facility]   4/30/2024  9:00 AM [facility] RM 2 [facility] Reche Mealing   4/30/2024 10:00 AM BILL Cash CNP [facility]

## 2023-12-06 NOTE — CARE COORDINATION
Case Management Assessment            Discharge Note                    Date / Time of Note: 12/6/2023 11:25 AM                  Discharge Note Completed by: BG Garner    Patient Name: Clayton Kruger   YOB: 1940  Diagnosis: Right shoulder pain, unspecified chronicity [M25.511]  Rotator cuff arthropathy of left shoulder [M12.812]   Date / Time: 12/5/2023  8:11 AM    Current PCP: Armando Xie MD  Clinic patient: No    Hospitalization in the last 30 days: No       Advance Directives:  Code Status: Full Code  [place] DNR form completed and on chart: Not Indicated    Financial:  Payor: MEDICARE / Plan: MEDICARE PART A AND B / Product Type: *No Product type* /      Pharmacy:    [facility] [address] [place] [address] [phone] - F [phone]  [address] [place] [address]  Phone: [phone] Fax: [phone]    [facility] [address] [phone] - f [phone]  [address] [place] [address] Drive [address]  Phone: [phone] Fax: [phone]      Assistance purchasing medications?:    Assistance provided by Case Management: None at this time    Does patient want to participate in local refill/ meds to beds program?:      Meds To Beds General Rules:  1. Can ONLY be done Monday- Friday between 8:30am-5pm  2. Prescription(s) must be in pharmacy by 3pm to be filled same day  3. Copy of patient's insurance/ prescription drug card and patient face sheet must be sent along with the prescription(s)  4. Cost of Rx cannot be added to hospital bill. If financial assistance is needed, please contact unit  or ;  or  CANNOT provide pharmacy voucher for patients co-pays  5.  Patients can then  the prescription on their way out of the hospital at discharge, or pharmacy can deliver to the bedside if staff is available. (payment

## 2023-12-06 NOTE — DISCHARGE SUMMARY
Discharge Summary  Total Shoulder Arthroplasty    Date:  23    Name:  Rafael Carney  Address:  [address] [place] [address],Building [facility] [address]    :  1936      Age:   81 y.o.    SSN:  [id number]      Medical Record Number:  [id number]    Discharge Date:  23    Admitting Physician  Henok Patton MD     Discharge Physician:  Rosa Isela Harris MD     Admission Diagnoses:  Left shoulder pain, unspecified chronicity [M25.512]  Shoulder arthritis [M19.019]     Discharge Diagnoses:  Left shoulder pain, unspecified chronicity [M25.512]  Shoulder arthritis [M19.019]     Treatments:  Right TSA     Hospital Course: This is a 81 y.o. female with a history of left shoulder DJD that failed conservative treatment. The patient elected to undergo TSA after discussing the risks, benefits, and alternatives to surgery. Total knee arthroplasty was performed without complication. Post op their diet was advanced as tolerated. Pain was controlled with a combination of IV and PO meds. DVT prophylaxis was with a combination of SCDs and ASA. On POD #1 the patient participated in physical therapy and made appropriate progress. After evaluation by the orthopaedic surgeon and the physical therapist, discharge was allowed on POD #1. The patient was medically stable during hospitalization. Consults:   Medicine     Significant Diagnostic Studies:  none     Disposition:  home     Meds:  See Med Reconciliation    Patient Instructions:    Activity: NWB operative arm in sling at all times  Diet: regular diet  Wound Care: keep wound clean and dry  DVT Prophylaxis: ASA    Follow-up with Physical Therapy and Surgeon as currently scheduled    Rosa Isela Harris MD  Orthopedic Surgery Sports Medicine Fellow

## 2023-12-06 NOTE — CONSULTS
V2.0  [facility] Consult Note      Name:  Ara Lynch /Age/Sex: 1940  (81 y.o. female)   MRN & CSN:  [id number] & [id number] Encounter Date/Time: 2023 11:24 AM EST   Location:  [facility]/2226- PCP: MD Timmy Neal MD  Consulting Provider: Deyanira Ghotra [facility] Day: 2    Assessment and Recommendations   Ara Lynch is a 81 y.o. female with pmh of breast cancer, hypertension, OA who presents with Rotator cuff arthropathy of left shoulder; reports pain was asked to see the patient for medical management; I saw her this morning she was sitting up in the chair pain is under control family at bedside lung sounds have been cleared denies any current issues she reports hypertension and takes her medication appropriately she does have a history of breast cancer as well; reports that other than her IV nothing else has been bothering her    Hospital Problems             Last Modified POA    * (Principal) Rotator cuff arthropathy of left shoulder 2023 Yes       Recommendations:   Hypertension-controlled continue home losartan  POD1 s/p right reverse total shoulder arthroplasty-pain management nonweightbearing PT evaluation managed per Ortho  History of breast cancer  Diet ADULT DIET; Regular   DVT Prophylaxis [] Lovenox, []  Heparin, [] SCDs, [] Ambulation,  [] Eliquis, [] Xarelto   Code Status Full Code   Surrogate Decision Maker/ POA Next of kin     Personally reviewed Lab Studies and Imaging           Imaging that was interpreted personally includes x-ray shoulder right with postsurgical findings         History From:    History obtained from the patient.      History of Present Illness:      Chief Complaint:  reports history of hypertension though states it is under control pain is under control as well    Ara Lynch is a 81 y.o. female who presents with right reverse total shoulder arthroplasty for rotator cuff arthropathy      Review of Systems:

## 2023-12-06 NOTE — PLAN OF CARE
Problem: Discharge Planning  Goal: Discharge to home or other facility with appropriate resources  12/6/2023 0802 by Keri Barboza RN  Note: Pt expecting to discharge home. Internal medicine and ortho following for post-surgical management. Awaiting eval from PT/OT. Pt updated and educated on barriers to discharge and plan of care. Problem: Safety - Adult  Goal: Free from fall injury  12/6/2023 0802 by Keri Barboza RN  Outcome: Progressing  Note: Pt has remained free of fall injury this shift. Pt tolerates ambulation x1 assist. Gait belt and gripper socks applied for safety. Problem: ABCDS Injury Assessment  Goal: Absence of physical injury  12/6/2023 0802 by Keri Barboza RN  Outcome: Progressing  Note: Pt has remained free of physical injury this shift. Fall and safety precautions in place. Bed exit alarm activated, bed in lowest position with wheels locked, call light and belongings within reach. Problem: Pain  Goal: Verbalizes/displays adequate comfort level or baseline comfort level  12/6/2023 0802 by Keri Barboza RN  Outcome: Progressing  Note: Pt currently endorsing pain to her right shoulder rated at 2/10. Pain increases with exercise. Stated tolerable pain goal is 2. Pain currently managed per [facility] and non-pharm measures such as rest and repositioning to reduce pain and promote comfort.

## 2023-12-06 NOTE — OP NOTE
0  Vicryl in figure-of-eight fashion to close the deltopectoral interval.   We used 2-0 Vicryl in interrupted inverted fashion to close the  subcutaneous tissue. Routine skin closure with 4-0 Monocryl, Prineo  dressing to close the skin. Sterile compressive dressing was applied. The arm was placed in padded soft brace. The patient was repositioned  in supine position before this, and promptly awakened from anesthesia  having tolerated the procedure well and taken from the operating room to  the recovery room in satisfactory condition. PLAN:  The patient will be discharged on oral analgesics with instructions  to follow up in the office in one to two weeks.         Wong Barnett MD    D: 12/05/2023 11:57:05       T: 12/05/2023 14:31:30     [email or web address]  Job#: [id number]     Doc#: [id number]    CC:

## 2023-12-06 NOTE — PLAN OF CARE
Problem: Safety - Adult  Goal: Free from fall injury  12/5/2023 2226 by Ligia Calderon RN  Outcome: Progressing  Note: Fall precautions in place. Bed alarm on, bed in lowest position, call light within reach, non slip socks, hourly rounding. Problem: ABCDS Injury Assessment  Goal: Absence of physical injury  12/5/2023 2226 by Ligia Calderon RN  Outcome: Progressing     Problem: Pain  Goal: Verbalizes/displays adequate comfort level or baseline comfort level  12/5/2023 2226 by Ligia Calderon RN  Outcome: Progressing  Note: No complaints of pain thus far.

## 2023-12-08 ENCOUNTER — TELEPHONE (OUTPATIENT)
Dept: ORTHOPEDIC SURGERY | Age: 83
End: 2023-12-08

## 2023-12-08 NOTE — TELEPHONE ENCOUNTER
General Question     Subject: POST OP QUESTIONS  Patient and /or Facility Request: Le Melgar  Contact Number: [phone]    PT CLLED TO ASK IF CAN LEAVE DRESSING ON UNTIL APPT ON 12/13   PLEASE CLL TO ADV

## 2023-12-13 ENCOUNTER — OFFICE VISIT (OUTPATIENT)
Dept: ORTHOPEDIC SURGERY | Age: 83
End: 2023-12-13

## 2023-12-13 VITALS — WEIGHT: 105 LBS | BODY MASS INDEX: 20.62 KG/M2 | HEIGHT: 60 IN

## 2023-12-13 DIAGNOSIS — Z96.611 STATUS POST REVERSE TOTAL REPLACEMENT OF RIGHT SHOULDER: Primary | ICD-10-CM

## 2023-12-13 PROCEDURE — 99024 POSTOP FOLLOW-UP VISIT: CPT | Performed by: PHYSICIAN ASSISTANT

## 2023-12-13 NOTE — PROGRESS NOTES
History of Present Illness:  Torrey Haro is a 81 y.o. female who presents for a post operative visit. The patient underwent a right reverse total shoulder arthroplasty on 12/5/2023 by Dr. Edna Garland. Overall she is doing okay and feels that their pain is well controlled with current pain medications. She has been compliant with wearing the UltraSling brace at all times. The patient deny fevers, chills, numbness, tingling, and shortness of breath. Medical History:  Patient's medications, allergies, past medical, surgical, social and family histories were reviewed and updated as appropriate. No notes on file    Review of Systems  A 14 point review of systems was completed by the patient is available in the media section of the scanned medical record and was reviewed on 12/13/2023. Vital Signs: There were no vitals filed for this visit. General/Appearance: Alert and oriented and in no apparent distress. Skin:  There are no skin lesions, cellulitis, or extreme edema. The patient has warm and well-perfused Bilateral upper extremities with brisk capillary refill.      right Shoulder Exam:    Inspection: right shoulder incision that is clean, dry and intact and well approximated. The Prineo dressing is still in place. Mild ecchymosis and swelling are present as can be expected. There is no erythema, drainage or other signs of infection    Palpation:  No crepitus to gentle motion    Active Range of Motion: Deferred    Passive Range of Motion: Tolerates pendulum movement. Strength:  Deferred    Special Tests:  Deferred. Neurovascular: Sensation to light touch is intact, no motor deficits, palpable radial pulses 2+    Radiology:     Plain radiographs of the right shoulder comprising 3 views: AP and axillary lateral were obtained and reviewed in the office: Shows postsurgical changes from the reverse total shoulder replacement.   All the components are in good placement without any signs of
No difficulties

## 2023-12-14 ENCOUNTER — HOSPITAL ENCOUNTER (OUTPATIENT)
Dept: PHYSICAL THERAPY | Age: 83
Setting detail: THERAPIES SERIES
Discharge: HOME | End: 2023-12-14
Payer: MEDICARE

## 2023-12-14 DIAGNOSIS — Z96.611 STATUS POST REVERSE TOTAL REPLACEMENT OF RIGHT SHOULDER: Primary | ICD-10-CM

## 2023-12-14 DIAGNOSIS — R29.898 SHOULDER WEAKNESS: ICD-10-CM

## 2023-12-14 DIAGNOSIS — M25.611 DECREASED RANGE OF MOTION OF RIGHT SHOULDER: ICD-10-CM

## 2023-12-14 PROCEDURE — 97530 THERAPEUTIC ACTIVITIES: CPT

## 2023-12-14 PROCEDURE — 97110 THERAPEUTIC EXERCISES: CPT

## 2023-12-14 PROCEDURE — 97161 PT EVAL LOW COMPLEX 20 MIN: CPT

## 2023-12-14 PROCEDURE — 97016 VASOPNEUMATIC DEVICE THERAPY: CPT

## 2023-12-14 NOTE — FLOWSHEET NOTE
continued outpatient therapy services to address the deficits outlined in the patients goals    Treatment/Activity Tolerance:  [x] Patient tolerated treatment well [] Patient limited by fatique  [] Patient limited by pain  [] Patient limited by other medical complications  [] Other:     Return to Play: NA    Prognosis for POC: [x] Good [] Fair  [] Poor    Patient requires continued skilled intervention: [x] Yes  [] No      GOALS     Patient stated goal: \"Regain use of right arm\"  Status: [] Progressing: [] Met: [] Not Met: [] Adjusted     Therapist goals for Patient:   Short Term Goals: To be achieved in: 2-4 weeks  Independent in HEP and progression per patient tolerance, in order to progress toward full function and prevent re-injury. Status: [] Progressing: [] Met: [] Not Met: [] Adjusted  Patient will have a decrease in pain to 0/10 to help  facilitate improvement in movement, function, and ADLs as indicated by functional deficits. Status: [] Progressing: [] Met: [] Not Met: [] Adjusted     Long Term Goals: To be achieved in: 8-10 weeks  Disability index score of 25% or less for the SPADI to assist with return to prior level of function. Status: [] Progressing: [] Met: [] Not Met: [] Adjusted  Improve shoulder PROM to 130 degrees or  better to allow for proper joint functioning as indicated by patients functional deficits. Status: [] Progressing: [] Met: [] Not Met: [] Adjusted  Patient able to actively raise shoulder to 120 deg to improve [place] activities such as bathing or cleaning her house    Status: [] Progressing: [] Met: [] Not Met: [] Adjusted  Patient will return to gardening without increased symptoms or restriction to work towards return to prior level of function. Status: [] Progressing: [] Met: [] Not Met: [] Adjusted  Patient will increase UE function to allow independence in all self-care activities.

## 2023-12-14 NOTE — PLAN OF CARE
Screening completed  [] PCP notified via Plan of Care  [] Emergency services notified     Preferred Language for Healthcare:   [x] English       [] other:    SUBJECTIVE EXAMINATION     Patient stated complaint: Hal Obrien is a 81 y.o. female presenting today to outpatient PT with with complaints of right shoulder pain following rTSA on 12/5/23. Patient reports pain is well controlled. She presents in arm sling w/ adduction pillow. Patient lives in 2 level home with her . States she has been icing the shoulder per instruction by MD.       Relevant Medical History: Prior history of breast Cancer, HTN, Osteoporosis, Depression, history of covid    Pain:  Patient Scale: VAS: 0-2/10  Pain location: globally around the right shoulder  Patient describes pain to be intermittent, dull, and aching  Pain decreases with: Sitting, Resting, Ice, Heat, Medication, and use of arm sling    Functional Outcome Measure:    Date Assessed 12/14/2023   Measure Used SPADI   Disability Score (rawscore/%) 96/130      Occupation/School:  Work/School Status: Retired  Job Duties/Demands: NA    Sport/ Recreation/ Leisure/ Hobbies: NA    Review Of Systems (ROS):  [x] Performed Review of systems (Integumentary, CardioPulmonary, Neurological) by intake and observation. Intake form has been scanned into medical record. Patient has been instructed to contact their primary care physician regarding ROS issues if not already being addressed at this time. [x] Patient history, allergies, meds reviewed. Medical chart reviewed. See intake form.      Co-morbidities/Complexities (which will affect course of rehabilitation):  []None        Arthritic conditions  [] Rheumatoid arthritis (M05.9)  [] Osteoarthritis (M19.91)  [] Gout        Neurological conditions  [] Prior Stroke (I69.30)  [] Parkinson's (G20)  [] Encephalopathy (G93.40)  [] Multiple Sclerosis (G35)  [] Post-polio (G14)  [] Spinal cord injury (SCI)  [] Traumatic brain injury (TBI)  []

## 2024-01-02 ENCOUNTER — HOSPITAL ENCOUNTER (OUTPATIENT)
Dept: PHYSICAL THERAPY | Age: 84
Setting detail: THERAPIES SERIES
Discharge: HOME | End: 2024-01-02
Payer: MEDICARE

## 2024-01-02 PROCEDURE — 97110 THERAPEUTIC EXERCISES: CPT | Performed by: PHYSICAL THERAPIST

## 2024-01-02 PROCEDURE — 97016 VASOPNEUMATIC DEVICE THERAPY: CPT | Performed by: PHYSICAL THERAPIST

## 2024-01-02 PROCEDURE — 97140 MANUAL THERAPY 1/> REGIONS: CPT | Performed by: PHYSICAL THERAPIST

## 2024-01-02 NOTE — FLOWSHEET NOTE
[facility] - Outpatient Rehabilitation and Therapy [address], [place] [address] office: [phone] fax: [phone]      Physical Therapy: TREATMENT/PROGRESS NOTE   Patient: Le Melgar (83 y.o. female)   Treatment Date: 2024   :  1940 MRN: [id number]   Visit #: 3   Insurance Allowable Auth Needed   BOMN []Yes    [x]No    Insurance: Payor: MEDICARE / Plan: MEDICARE PART A AND B / Product Type: *No Product type* /   Insurance ID: [id number] - (Medicare)  Secondary Insurance (if applicable): GENERIC FIRST [facility]*   Treatment Diagnosis:     ICD-10-CM    1. 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 Shoulder weakness  R29.898                Biceps tenodesis   3. 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  RTSA DOS: 23    Preferred Language for Healthcare:   [x]English       []other:    SUBJECTIVE EXAMINATION     Patient Report/Comments:  Pt. Reports that her shoulder is feeling pretty good. She continues to have some soreness into her hand. Pt. Reports compliance with sling and HEP.      Test used Initial score  23   Pain Summary VAS 0-1/10 .5   Functional questionnaire SPADI 96/130    Other:                OBJECTIVE EXAMINATION     Observation:

## 2024-01-03 ENCOUNTER — OFFICE VISIT (OUTPATIENT)
Dept: ORTHOPEDIC SURGERY | Age: 84
End: 2024-01-03

## 2024-01-03 VITALS — HEIGHT: 60 IN | WEIGHT: 105 LBS | BODY MASS INDEX: 20.62 KG/M2

## 2024-01-03 DIAGNOSIS — Z96.611 STATUS POST REVERSE TOTAL REPLACEMENT OF RIGHT SHOULDER: Primary | ICD-10-CM

## 2024-01-03 PROCEDURE — 99024 POSTOP FOLLOW-UP VISIT: CPT | Performed by: PHYSICIAN ASSISTANT

## 2024-01-03 NOTE — PROGRESS NOTES
History of Present Illness:  Le Melgar is a 83 y.o. female who presents for a post operative visit.  The patient underwent a right reverse total shoulder arthroplasty on 12/5/2023 by Dr. Phani Galvan.  She is currently 4 weeks postop.  She denies any pain or instability with the shoulder.  She reports that she has been going to therapy at the [place] office.  She has been compliant with wearing the UltraSling brace at all times.    The patient deny fevers, chills, numbness, tingling, and shortness of breath.    Medical History:  Patient's medications, allergies, past medical, surgical, social and family histories were reviewed and updated as appropriate.    No notes on file    Review of Systems  A 14 point review of systems was completed by the patient is available in the media section of the scanned medical record and was reviewed on 1/3/2024.      Vital Signs:  There were no vitals filed for this visit.    General/Appearance: Alert and oriented and in no apparent distress.    Skin:  There are no skin lesions, cellulitis, or extreme edema. The patient has warm and well-perfused Bilateral upper extremities with brisk capillary refill.      right Shoulder Exam:    Inspection: right shoulder incision that is clean, dry and intact and well approximated. The Prineo dressing is still in place.  Mild ecchymosis and swelling are present as can be expected. There is no erythema, drainage or other signs of infection    Palpation:  No crepitus to gentle motion    Active Range of Motion: Deferred    Passive Range of Motion: Forward elevation of 80 degrees and external rotation of 10 degrees    Strength:  Deferred    Neurovascular: Sensation to light touch is intact, no motor deficits, palpable radial pulses 2+    Radiology:     Plain radiographs not obtained today.         Assessment :  Ms. Le Melgar is a 83 y.o. patient status post a right reverse total shoulder arthroplasty.  Surgery was 12/5/2023.

## 2024-01-09 ENCOUNTER — HOSPITAL ENCOUNTER (OUTPATIENT)
Dept: PHYSICAL THERAPY | Age: 84
Setting detail: THERAPIES SERIES
Discharge: HOME | End: 2024-01-09
Payer: MEDICARE

## 2024-01-09 PROCEDURE — 97016 VASOPNEUMATIC DEVICE THERAPY: CPT | Performed by: PHYSICAL THERAPIST

## 2024-01-09 PROCEDURE — 97140 MANUAL THERAPY 1/> REGIONS: CPT | Performed by: PHYSICAL THERAPIST

## 2024-01-09 PROCEDURE — 97110 THERAPEUTIC EXERCISES: CPT | Performed by: PHYSICAL THERAPIST

## 2024-01-09 NOTE — FLOWSHEET NOTE
daily - 3-5 sets - 20-30 seconds hold  - Seated Scapular Retraction  - 1-2 x daily - 20 reps  - Seated Shoulder Shrug Circles AROM Backward  - 1-2 x daily - 20 reps  - Seated Neck Sidebending Stretch  - 1-2 x daily - 10 reps - 10 seconds hold    ASSESSMENT     Today's Assessment: Pt. Tolerated therapy today without complaints. Noted improvement in shoulder PROM. Able to initiate pulleys per MD protocol, cueing throughout to reduce muscle guarding. Effusion continues to improve throughout R upper arm. Good technique with HEP. Pt. Notes slight soreness with ER AAROM exercise that subsides immediately following activity. Pt. Requires continued progression of post-op protocol in order to safely restore shoulder function.     Medical Necessity Documentation:  I certify that this patient meets the below criteria necessary for medical necessity for care and/or justification of therapy services:  The patient has functional impairments and/or activity limitations and would benefit from continued outpatient therapy services to address the deficits outlined in the patients goals    Treatment/Activity Tolerance:  [x] Patient tolerated treatment well [] Patient limited by fatique  [] Patient limited by pain  [] Patient limited by other medical complications  [] Other:     Return to Play: NA    Prognosis for POC: [x] Good [] Fair  [] Poor    Patient requires continued skilled intervention: [x] Yes  [] No      GOALS     Patient stated goal: \"Regain use of right arm\"  Status: [] Progressing: [] Met: [] Not Met: [] Adjusted     Therapist goals for Patient:   Short Term Goals: To be achieved in: 2-4 weeks  Independent in HEP and progression per patient tolerance, in order to progress toward full function and prevent re-injury.               Status: [] Progressing: [] Met: [] Not Met: [] Adjusted  Patient will have a decrease in pain to 0/10 to help  facilitate improvement in movement, function, and ADLs as indicated by functional

## 2024-01-10 ENCOUNTER — APPOINTMENT (OUTPATIENT)
Dept: PHYSICAL THERAPY | Age: 84
End: 2024-01-10
Payer: MEDICARE

## 2024-01-12 ENCOUNTER — HOSPITAL ENCOUNTER (OUTPATIENT)
Dept: PHYSICAL THERAPY | Age: 84
Setting detail: THERAPIES SERIES
Discharge: HOME | End: 2024-01-12
Payer: MEDICARE

## 2024-01-12 PROCEDURE — 97016 VASOPNEUMATIC DEVICE THERAPY: CPT | Performed by: SPECIALIST/TECHNOLOGIST

## 2024-01-12 PROCEDURE — 97140 MANUAL THERAPY 1/> REGIONS: CPT | Performed by: SPECIALIST/TECHNOLOGIST

## 2024-01-12 PROCEDURE — 97110 THERAPEUTIC EXERCISES: CPT | Performed by: SPECIALIST/TECHNOLOGIST

## 2024-01-12 NOTE — FLOWSHEET NOTE
[facility] - Outpatient Rehabilitation and Therapy [address], [place] [address] office: [phone] fax: [phone]      Physical Therapy: TREATMENT/PROGRESS NOTE   Patient: Le Melgar (83 y.o. female)   Treatment Date: 2024   :  1940 MRN: [id number]   Visit #: 5   Insurance Allowable Auth Needed   BOMN []Yes    [x]No    Insurance: Payor: MEDICARE / Plan: MEDICARE PART A AND B / Product Type: *No Product type* /   Insurance ID: [id number] - (Medicare)  Secondary Insurance (if applicable): GENERIC FIRST [facility]*   Treatment Diagnosis:     ICD-10-CM    1. 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 Shoulder weakness  R29.898                Biceps tenodesis   3. 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  RTSA DOS: 23    Preferred Language for Healthcare:   [x]English       []other:    SUBJECTIVE EXAMINATION     Patient Report/Comments:   5 weeks s/p Pt. Reports  Rt shoulder and denies any issues, not taking any meds.  Pt is icing her Rt shoulder and doing her HEP.    Test used Initial score  23   Pain Summary VAS 0-1/10 0   Functional questionnaire SPADI 96/130    Other:                OBJECTIVE EXAMINATION     Observation:    RT arm central

## 2024-01-15 ENCOUNTER — OFFICE VISIT (OUTPATIENT)
Dept: ORTHOPEDIC SURGERY | Age: 84
End: 2024-01-15

## 2024-01-15 VITALS — BODY MASS INDEX: 20.62 KG/M2 | HEIGHT: 60 IN | WEIGHT: 105 LBS

## 2024-01-15 DIAGNOSIS — Z96.611 STATUS POST REVERSE TOTAL REPLACEMENT OF RIGHT SHOULDER: Primary | ICD-10-CM

## 2024-01-15 PROCEDURE — 99024 POSTOP FOLLOW-UP VISIT: CPT | Performed by: PHYSICIAN ASSISTANT

## 2024-01-15 RX ORDER — HYDROCODONE BITARTRATE AND ACETAMINOPHEN 5; 325 MG/1; MG/1
1 TABLET ORAL EVERY 6 HOURS PRN
Qty: 20 TABLET | Refills: 0 | Status: SHIPPED | OUTPATIENT
Start: 2024-01-15 | End: 2024-01-20

## 2024-01-15 NOTE — PROGRESS NOTES
History of Present Illness:  Le Melgar is a 83 y.o. female who presents for a post operative visit.  The patient underwent a right reverse total shoulder arthroplasty on 12/5/2023.  She is currently 6 weeks postop.  She denies any pain or instability with the shoulder.  She reports that she has been going to therapy at the [facility] but will plan to move to the [place] office. She has been compliant with wearing the UltraSling brace at all times.    The patient deny fevers, chills, numbness, tingling, and shortness of breath.    Medical History:  Patient's medications, allergies, past medical, surgical, social and family histories were reviewed and updated as appropriate.    No notes on file    Review of Systems  A 14 point review of systems was completed by the patient is available in the media section of the scanned medical record and was reviewed on 1/15/2024.      Vital Signs:  There were no vitals filed for this visit.    General/Appearance: Alert and oriented and in no apparent distress.    Skin:  There are no skin lesions, cellulitis, or extreme edema. The patient has warm and well-perfused Bilateral upper extremities with brisk capillary refill.      right Shoulder Exam:    Inspection: right shoulder incision that is clean, dry and intact and well approximated. The Prineo dressing is still in place.  Mild ecchymosis and swelling are present as can be expected. There is no erythema, drainage or other signs of infection    Palpation:  No crepitus to gentle motion    Active Range of Motion: Forward elevation of 80 degrees, external rotation of 20 and internal rotation to the back pocket    Passive Range of Motion: Forward elevation of 120    Strength:  Deferred    Neurovascular: Sensation to light touch is intact, no motor deficits, palpable radial pulses 2+    Radiology:     Plain radiographs not obtained today.         Assessment :  Ms. Le eMlgar is a 83 y.o. patient status post a right

## 2024-01-16 ENCOUNTER — HOSPITAL ENCOUNTER (OUTPATIENT)
Dept: PHYSICAL THERAPY | Age: 84
Setting detail: THERAPIES SERIES
Discharge: HOME | End: 2024-01-16
Payer: MEDICARE

## 2024-01-16 PROCEDURE — 97110 THERAPEUTIC EXERCISES: CPT

## 2024-01-16 PROCEDURE — 97016 VASOPNEUMATIC DEVICE THERAPY: CPT

## 2024-01-16 NOTE — PLAN OF CARE
activities.  Status: [x] Progressing: [] Met: [] Not Met: [] Adjusted    Overall Progression Towards Functional goals/ Treatment Progress Update:  [] Patient is progressing as expected towards functional goals listed.    [] Progression is slowed due to complexities/Impairments listed.  [] Progression has been slowed due to co-morbidities.  [x] Plan just implemented, too soon (<30days) to assess goals progression   [] Goals require adjustment due to lack of progress  [] Patient is not progressing as expected and requires additional follow up with physician  [] Other:     CHARGE CAPTURE     PT CHARGE GRID   CPT Code (TIMED) minutes # CPT Code (UNTIMED) #     Therex (87583)  25 2  EVAL:LOW (58715 - Typically 20 minutes face-to-face)     Neuromusc. Re-ed (43371)    Re-Eval (50711)     Manual (19743) 5   Estim Unattended (02989)     Ther. Act (17347) 5   Mech. Traction (55372)     Gait (11584)    Dry Needle 1-2 muscle (45788)     Aquatic Therex (26440)    Dry Needle 3+ muscle (20561)     Iontophoresis (52006)    VASO (74384) 1    Ultrasound (25731)    Group Therapy (20760)     Estim Attended (63293)    Canalith Repositioning (03451)     Other:    Other:    Total Timed Code Tx Minutes  2  1     Total Treatment Minutes 45        Charge Justification:  (90981) THERAPEUTIC EXERCISE - Provided verbal/tactile cueing for activities related to strengthening, flexibility, endurance, ROM performed to prevent loss of range of motion, maintain or improve muscular strength or increase flexibility, following either an injury or surgery.   (44374) THERAPEUTIC ACTIVITY - use of dynamic activities to improve functional performance. (Ex include squatting, ascending/descending stairs, walking, bending, lifting, catching, throwing, pushing, pulling, jumping.)  Direct, one on one contact, billed in 15-minute increments.  (15899) MANUAL THERAPY -  Manual therapy techniques, 1 or more regions, each 15 minutes (Mobilization/manipulation, manual

## 2024-01-19 ENCOUNTER — APPOINTMENT (OUTPATIENT)
Dept: PHYSICAL THERAPY | Age: 84
End: 2024-01-19
Payer: MEDICARE

## 2024-01-23 ENCOUNTER — HOSPITAL ENCOUNTER (OUTPATIENT)
Dept: PHYSICAL THERAPY | Age: 84
Setting detail: THERAPIES SERIES
Discharge: HOME | End: 2024-01-23
Payer: MEDICARE

## 2024-01-23 PROCEDURE — 97110 THERAPEUTIC EXERCISES: CPT

## 2024-01-23 PROCEDURE — 97530 THERAPEUTIC ACTIVITIES: CPT

## 2024-01-23 NOTE — FLOWSHEET NOTE
[facility] - Outpatient Rehabilitation and Therapy [address], [place] [address] office: [phone] fax: [phone]    Physical Therapy: TREATMENT/PROGRESS NOTE   Patient: Le Melgar (83 y.o. female)   Treatment Date: 2024   :  1940 MRN: [id number]   Visit #: 7   Insurance Allowable Auth Needed   BOMN []Yes    [x]No    Insurance: Payor: MEDICARE / Plan: MEDICARE PART A AND B / Product Type: *No Product type* /   Insurance ID: [id number] - (Medicare)  Secondary Insurance (if applicable): GENERIC FIRST [facility]*   Treatment Diagnosis:     ICD-10-CM    1. 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 Shoulder weakness  R29.898                Biceps tenodesis   3. 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  RTSA DOS: 23    Preferred Language for Healthcare:   [x]English       []other:    SUBJECTIVE EXAMINATION     Patient Report/Comments: Patient reports no soreness following last session. States she is still having periods of soreness globally throughout the shoulder.      Test used Initial score  23   Pain Summary VAS 0-1/10 0/10   Functional questionnaire SPADI 96/130 UPDATE NPV   Other:                OBJECTIVE EXAMINATION     Observation:   24:

## 2024-01-30 ENCOUNTER — HOSPITAL ENCOUNTER (OUTPATIENT)
Dept: PHYSICAL THERAPY | Age: 84
Setting detail: THERAPIES SERIES
Discharge: HOME | End: 2024-01-30
Payer: MEDICARE

## 2024-01-30 PROCEDURE — 97110 THERAPEUTIC EXERCISES: CPT

## 2024-01-30 NOTE — FLOWSHEET NOTE
Myron Garcia, PT        Date: 01/30/2024     Note: If patient does not return for scheduled/recommended follow up visits, this note will serve as a discharge from care along with the most recent update on progress.

## 2024-02-01 ENCOUNTER — HOSPITAL ENCOUNTER (OUTPATIENT)
Dept: PHYSICAL THERAPY | Age: 84
Setting detail: THERAPIES SERIES
Discharge: HOME | End: 2024-02-01
Payer: MEDICARE

## 2024-02-01 PROCEDURE — 97110 THERAPEUTIC EXERCISES: CPT

## 2024-02-01 NOTE — FLOWSHEET NOTE
[facility] - Outpatient Rehabilitation and Therapy [address], [place] [address] office: [phone] fax: [phone]    Physical Therapy: TREATMENT/PROGRESS NOTE   Patient: Le Melgar (83 y.o. female)   Treatment Date: 2024   :  1940 MRN: [id number]   Visit #: 9   Insurance Allowable Auth Needed   BOMN []Yes    [x]No    Insurance: Payor: MEDICARE / Plan: MEDICARE PART A AND B / Product Type: *No Product type* /   Insurance ID: [id number] - (Medicare)  Secondary Insurance (if applicable): GENERIC FIRST [facility]*   Treatment Diagnosis:     ICD-10-CM    1. 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 Shoulder weakness  R29.898                Biceps tenodesis   3. 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  RTSA DOS: 23    Preferred Language for Healthcare:   [x]English       []other:    SUBJECTIVE EXAMINATION     Patient Report/Comments: Patient reports no increase in pain or significant soreness following last session.       Test used Initial score  23     Pain Summary VAS 0-1/10 0/10    Functional questionnaire SPADI 96/130 31/130    Other:                  OBJECTIVE EXAMINATION     Observation:   24: Upon further conversation with

## 2024-02-06 ENCOUNTER — HOSPITAL ENCOUNTER (OUTPATIENT)
Dept: PHYSICAL THERAPY | Age: 84
Setting detail: THERAPIES SERIES
Discharge: HOME | End: 2024-02-06
Payer: MEDICARE

## 2024-02-06 PROCEDURE — 97110 THERAPEUTIC EXERCISES: CPT

## 2024-02-06 PROCEDURE — 97140 MANUAL THERAPY 1/> REGIONS: CPT

## 2024-02-06 NOTE — FLOWSHEET NOTE
[facility] - Outpatient Rehabilitation and Therapy [address], [place] [address] office: [phone] fax: [phone]    Physical Therapy: TREATMENT/PROGRESS NOTE   Patient: Le Melgar (83 y.o. female)   Treatment Date: 2024   :  1940 MRN: [id number]   Visit #: 10   Insurance Allowable Auth Needed   BOMN []Yes    [x]No    Insurance: Payor: MEDICARE / Plan: MEDICARE PART A AND B / Product Type: *No Product type* /   Insurance ID: [id number] - (Medicare)  Secondary Insurance (if applicable): GENERIC FIRST [facility]*   Treatment Diagnosis:     ICD-10-CM    1. 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 Shoulder weakness  R29.898                Biceps tenodesis   3. 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  RTSA DOS: 23    Preferred Language for Healthcare:   [x]English       []other:    SUBJECTIVE EXAMINATION     Patient Report/Comments: Patient present to therapy with reports of increased posterior cuff/shoulder soreness and pain during AROM.       Test used Initial score  23     Pain Summary VAS 0-1/10 0/10    Functional questionnaire SPADI 96/130 31/130    Other:                  OBJECTIVE EXAMINATION     Observation:   : hypertonicity

## 2024-02-09 ENCOUNTER — HOSPITAL ENCOUNTER (OUTPATIENT)
Dept: PHYSICAL THERAPY | Age: 84
Setting detail: THERAPIES SERIES
Discharge: HOME | End: 2024-02-09
Payer: MEDICARE

## 2024-02-09 PROCEDURE — 97110 THERAPEUTIC EXERCISES: CPT | Performed by: SPECIALIST/TECHNOLOGIST

## 2024-02-09 PROCEDURE — 97140 MANUAL THERAPY 1/> REGIONS: CPT | Performed by: SPECIALIST/TECHNOLOGIST

## 2024-02-09 NOTE — FLOWSHEET NOTE
Charge Justification:  (80285) THERAPEUTIC EXERCISE - Provided verbal/tactile cueing for activities related to strengthening, flexibility, endurance, ROM performed to prevent loss of range of motion, maintain or improve muscular strength or increase flexibility, following either an injury or surgery.   (68463) THERAPEUTIC ACTIVITY - use of dynamic activities to improve functional performance. (Ex include squatting, ascending/descending stairs, walking, bending, lifting, catching, throwing, pushing, pulling, jumping.)  Direct, one on one contact, billed in 15-minute increments.    TREATMENT PLAN   Plan: Slowly progress AROM. Advised to continue with previous HEP and add High rows w/ yellow and No B ER as able    Electronically Signed by Gretel Monk PTA , [id number]      Date: 02/09/2024     Note: If patient does not return for scheduled/recommended follow up visits, this note will serve as a discharge from care along with the most recent update on progress.

## 2024-02-12 ENCOUNTER — OFFICE VISIT (OUTPATIENT)
Dept: ORTHOPEDIC SURGERY | Age: 84
End: 2024-02-12

## 2024-02-12 VITALS — WEIGHT: 105 LBS | BODY MASS INDEX: 20.62 KG/M2 | HEIGHT: 60 IN

## 2024-02-12 DIAGNOSIS — Z96.611 STATUS POST REVERSE TOTAL REPLACEMENT OF RIGHT SHOULDER: Primary | ICD-10-CM

## 2024-02-12 PROCEDURE — 99024 POSTOP FOLLOW-UP VISIT: CPT | Performed by: PHYSICIAN ASSISTANT

## 2024-02-12 NOTE — PROGRESS NOTES
total shoulder arthroplasty.  Surgery was 12/5/2023.        Impression:  Encounter Diagnosis   Name Primary?    Status post reverse total replacement of right shoulder Yes         Office Procedures:  No orders of the defined types were placed in this encounter.      Treatment Plan:    Overall the patient is doing well.  The pain is well-controlled. Will have her go to therapy twice a week.  Her therapy orders for updated in the chart today.  All of her questions were fully answered today.  We would like to see her back in 4 weeks for follow-up visit.      1:29 PM      Sushma Nagar, PA-C  Orthopaedic Sports Medicine Physician Assistant      This dictation was performed with a verbal recognition program (DRAGON) and it was checked for errors.  It is possible that there are still dictated errors within this office note.  If so, please bring any errors to my attention for an addendum.  All efforts were made to ensure that this office note is accurate.

## 2024-02-14 ENCOUNTER — HOSPITAL ENCOUNTER (OUTPATIENT)
Dept: PHYSICAL THERAPY | Age: 84
Setting detail: THERAPIES SERIES
Discharge: HOME | End: 2024-02-14
Payer: MEDICARE

## 2024-02-14 PROCEDURE — 97110 THERAPEUTIC EXERCISES: CPT

## 2024-02-14 PROCEDURE — 97140 MANUAL THERAPY 1/> REGIONS: CPT

## 2024-02-14 NOTE — FLOWSHEET NOTE
[facility] - Outpatient Rehabilitation and Therapy [address], [place] [address] office: [phone] fax: [phone]    Physical Therapy: TREATMENT/PROGRESS NOTE   Patient: Le Melgar (83 y.o. female)   Treatment Date: 2024   :  1940 MRN: [id number]   Visit #: 12   Insurance Allowable Auth Needed   BOMN []Yes    [x]No    Insurance: Payor: MEDICARE / Plan: MEDICARE PART A AND B / Product Type: *No Product type* /   Insurance ID: [id number] - (Medicare)  Secondary Insurance (if applicable): GENERIC [facility]*   Treatment Diagnosis:     ICD-10-CM    1. 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 Shoulder weakness  R29.898                Biceps tenodesis   3. 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  RTSA DOS: 23    Preferred Language for Healthcare:   [x]English       []other:    SUBJECTIVE EXAMINATION     Patient Report/Comments: Patient reports shoulder soreness and discomfort has improved since addition of STM and decreasing home exercises.      Test used Initial score  23     Pain Summary VAS 0-1/10 0/10 0/10   Functional questionnaire SPADI 96/130 31/130    Other:                  OBJECTIVE EXAMINATION     Observation:    +

## 2024-02-16 ENCOUNTER — HOSPITAL ENCOUNTER (OUTPATIENT)
Dept: PHYSICAL THERAPY | Age: 84
Setting detail: THERAPIES SERIES
Discharge: HOME | End: 2024-02-16
Payer: MEDICARE

## 2024-02-16 PROCEDURE — 97110 THERAPEUTIC EXERCISES: CPT

## 2024-02-16 NOTE — FLOWSHEET NOTE
proper joint functioning as indicated by patients functional deficits.  Status: [x] Progressing: [] Met: [] Not Met: [] Adjusted  Patient able to actively raise shoulder to 120 deg to improve [place] activities such as bathing or cleaning her house    Status: [x] Progressing: [] Met: [] Not Met: [] Adjusted  Patient will return to gardening without increased symptoms or restriction to work towards return to prior level of function  Status: [x] Progressing: [] Met: [] Not Met: [] Adjusted  Patient will increase UE function to allow independence in all self-care activities.  Status: [x] Progressing: [] Met: [] Not Met: [] Adjusted    Overall Progression Towards Functional goals/ Treatment Progress Update:  [x] Patient is progressing as expected towards functional goals listed.    [] Progression is slowed due to complexities/Impairments listed.  [] Progression has been slowed due to co-morbidities.  [] Plan just implemented, too soon (<30days) to assess goals progression   [] Goals require adjustment due to lack of progress  [] Patient is not progressing as expected and requires additional follow up with physician  [] Other:     CHARGE CAPTURE     PT CHARGE GRID   CPT Code (TIMED) minutes # CPT Code (UNTIMED) #     Therex (07331)  40 3  EVAL:LOW (92777 - Typically 20 minutes face-to-face)     Neuromusc. Re-ed (08290)    Re-Eval (77839)     Manual (35269)    Estim Unattended (74468)     Ther. Act (15841)    [facility]. Traction (40395)     Gait (70272)    Dry Needle 1-2 muscle (51519)     Aquatic Therex (58631)    Dry Needle 3+ muscle (20561)     Iontophoresis (31995)    VASO (27888)     Ultrasound (23022)    Group Therapy (54424)     Estim Attended (90750)    Canalith Repositioning (88916)     Other:    Other:    Total Timed Code Tx Minutes 40 3       Total Treatment Minutes 40        Charge Justification:  (11187) THERAPEUTIC EXERCISE - Provided verbal/tactile cueing for activities related to strengthening, flexibility,

## 2024-02-20 ENCOUNTER — HOSPITAL ENCOUNTER (OUTPATIENT)
Dept: PHYSICAL THERAPY | Age: 84
Setting detail: THERAPIES SERIES
Discharge: HOME | End: 2024-02-20
Payer: MEDICARE

## 2024-02-20 PROCEDURE — 97140 MANUAL THERAPY 1/> REGIONS: CPT

## 2024-02-20 NOTE — FLOWSHEET NOTE
[facility] - Outpatient Rehabilitation and Therapy [address], [place] [address] office: [phone] fax: [phone]        Physical Therapy: TREATMENT/PROGRESS NOTE   Patient: Le Melgar (83 y.o. female)   Treatment Date: 2024   :  1940 MRN: [id number]   Visit #: 14   Insurance Allowable Auth Needed   BOMN []Yes    [x]No    Insurance: Payor: MEDICARE / Plan: MEDICARE PART A AND B / Product Type: *No Product type* /   Insurance ID: [id number] - (Medicare)  Secondary Insurance (if applicable): GENERIC FIRST [facility]*   Treatment Diagnosis:     ICD-10-CM    1. 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 Shoulder weakness  R29.898                Biceps tenodesis   3. 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  RTSA DOS: 23    Preferred Language for Healthcare:   [x]English       []other:    SUBJECTIVE EXAMINATION     Patient Report/Comments: Patient reports significant soreness following last PT session. States she has noticed increase difficulty and discomfort/soreness with all ADLs or just basic movements of the RUE.     Test used Initial score  23     Pain Summary VAS 0-1/10 0/10 0/10   Functional questionnaire SPADI 96/130 31/130 Update NPV   Other:

## 2024-02-23 ENCOUNTER — HOSPITAL ENCOUNTER (OUTPATIENT)
Dept: PHYSICAL THERAPY | Age: 84
Setting detail: THERAPIES SERIES
Discharge: HOME | End: 2024-02-23
Payer: MEDICARE

## 2024-02-23 PROCEDURE — 97110 THERAPEUTIC EXERCISES: CPT

## 2024-02-23 PROCEDURE — 97140 MANUAL THERAPY 1/> REGIONS: CPT

## 2024-02-23 NOTE — FLOWSHEET NOTE
(51641)  8 1  EVAL:LOW (98798 - Typically 20 minutes face-to-face)     Neuromusc. Re-ed (26783)    Re-Eval (72398)     Manual (85310) 30 2  Estim Unattended (07016)     Ther. Act (39198)    Mech. Traction (73649)     Gait (14365)    Dry Needle 1-2 muscle (89313)     Aquatic Therex (36741)    Dry Needle 3+ muscle (20561)     Iontophoresis (46060)    VASO (22457)     Ultrasound (74048)    Group Therapy (56515)     Estim Attended (16733)    Canalith Repositioning (75438)     Other:    Other:    Total Timed Code Tx Minutes 38 1       Total Treatment Minutes 53        Charge Justification:  (26794) THERAPEUTIC EXERCISE - Provided verbal/tactile cueing for activities related to strengthening, flexibility, endurance, ROM performed to prevent loss of range of motion, maintain or improve muscular strength or increase flexibility, following either an injury or surgery.   (59127) HOME EXERCISE PROGRAM - Reviewed/Progressed HEP activities related to strengthening, flexibility, endurance, ROM performed to prevent loss of range of motion, maintain or improve muscular strength or increase flexibility, following either an injury or surgery.    TREATMENT PLAN   Plan: reassess readiness to progress strength. Continue with STM to manage soreness.    Electronically Signed by Myron Garcia PT      Date: 02/23/2024     Note: If patient does not return for scheduled/recommended follow up visits, this note will serve as a discharge from care along with the most recent update on progress.

## 2024-02-27 ENCOUNTER — HOSPITAL ENCOUNTER (OUTPATIENT)
Dept: PHYSICAL THERAPY | Age: 84
Setting detail: THERAPIES SERIES
Discharge: HOME | End: 2024-02-27
Payer: MEDICARE

## 2024-02-27 PROCEDURE — 97140 MANUAL THERAPY 1/> REGIONS: CPT

## 2024-02-27 PROCEDURE — 97530 THERAPEUTIC ACTIVITIES: CPT

## 2024-02-27 PROCEDURE — 97110 THERAPEUTIC EXERCISES: CPT

## 2024-02-27 NOTE — PLAN OF CARE
Gait (21297)    Dry Needle 1-2 muscle (20560)     Aquatic Therex (13016)    Dry Needle 3+ muscle (20561)     Iontophoresis (04391)    VASO (86616)     Ultrasound (76249)    Group Therapy (77575)     Estim Attended (60527)    Canalith Repositioning (47362)     Other:    Other:    Total Timed Code Tx Minutes 38 3       Total Treatment Minutes 38        Charge Justification:  (11487) THERAPEUTIC EXERCISE - Provided verbal/tactile cueing for activities related to strengthening, flexibility, endurance, ROM performed to prevent loss of range of motion, maintain or improve muscular strength or increase flexibility, following either an injury or surgery.   (67766) HOME EXERCISE PROGRAM - Reviewed/Progressed HEP activities related to strengthening, flexibility, endurance, ROM performed to prevent loss of range of motion, maintain or improve muscular strength or increase flexibility, following either an injury or surgery.  (61235) HOME EXERCISE PROGRAM - Reviewed/Progressed HEP activities related to neuromuscular reeducation of movement, balance, coordination, kinesthetic sense, posture, and/or proprioception for sitting and/or standing activities    (52889) THERAPEUTIC ACTIVITY - use of dynamic activities to improve functional performance. (Ex include squatting, ascending/descending stairs, walking, bending, lifting, catching, throwing, pushing, pulling, jumping.)  Direct, one on one contact, billed in 15-minute increments.  (75916) MANUAL THERAPY -  Manual therapy techniques, 1 or more regions, each 15 minutes (Mobilization/manipulation, manual lymphatic drainage, manual traction) for the purpose of modulating pain, promoting relaxation,  increasing ROM, reducing/eliminating soft tissue swelling/inflammation/restriction, improving soft tissue extensibility and allowing for proper ROM for normal function with self care, mobility, lifting and ambulation    TREATMENT PLAN   Plan: reassess readiness to progress strength.

## 2024-03-01 ENCOUNTER — HOSPITAL ENCOUNTER (OUTPATIENT)
Dept: PHYSICAL THERAPY | Age: 84
Setting detail: THERAPIES SERIES
Discharge: HOME | End: 2024-03-01
Payer: MEDICARE

## 2024-03-01 PROCEDURE — 97110 THERAPEUTIC EXERCISES: CPT

## 2024-03-01 PROCEDURE — 97140 MANUAL THERAPY 1/> REGIONS: CPT

## 2024-03-01 NOTE — PLAN OF CARE
Other:     Return to Play: NA    Prognosis for POC: [x] Good [] Fair  [] Poor    Patient requires continued skilled intervention: [x] Yes  [] No      GOALS     Patient stated goal: \"Regain use of right arm\"  Status: [] Progressing: [] Met: [] Not Met: [] Adjusted     Therapist goals for Patient:   Short Term Goals: To be achieved in: 2-4 weeks  Independent in HEP and progression per patient tolerance, in order to progress toward full function and prevent re-injury.               Status: [] Progressing: [x] Met: [] Not Met: [] Adjusted  Patient will have a decrease in pain to 0/10 to help  facilitate improvement in movement, function, and ADLs as indicated by functional deficits.              Status: [] Progressing: [x] Met: [] Not Met: [] Adjusted     Long Term Goals: To be achieved in: 8-10 weeks  Disability index score of 25% or less for the SPADI to assist with return to prior level of function.                  Status: [x] Progressing: [] Met: [] Not Met: [] Adjusted  Improve shoulder PROM to 130 degrees or  better to allow for proper joint functioning as indicated by patients functional deficits.  Status: [x] Progressing: [] Met: [] Not Met: [] Adjusted  Patient able to actively raise shoulder to 120 deg to improve [place] activities such as bathing or cleaning her house    Status: [x] Progressing: [] Met: [] Not Met: [] Adjusted  Patient will return to gardening without increased symptoms or restriction to work towards return to prior level of function  Status: [x] Progressing: [] Met: [] Not Met: [] Adjusted  Patient will increase UE function to allow independence in all self-care activities.  Status: [x] Progressing: [] Met: [] Not Met: [] Adjusted    Overall Progression Towards Functional goals/ Treatment Progress Update:  [] Patient is progressing as expected towards functional goals listed.    [x] Progression is slowed due to complexities/Impairments listed.  [x] Progression has been slowed due to

## 2024-03-05 ENCOUNTER — HOSPITAL ENCOUNTER (OUTPATIENT)
Dept: PHYSICAL THERAPY | Age: 84
Setting detail: THERAPIES SERIES
Discharge: HOME | End: 2024-03-05
Payer: MEDICARE

## 2024-03-05 PROCEDURE — 97110 THERAPEUTIC EXERCISES: CPT

## 2024-03-05 PROCEDURE — 97140 MANUAL THERAPY 1/> REGIONS: CPT

## 2024-03-05 NOTE — FLOWSHEET NOTE
[facility] - Outpatient Rehabilitation and Therapy [address], [place] [address] office: [phone] fax: [phone]      Physical Therapy: TREATMENT/PROGRESS NOTE   Patient: Le Melgar (83 y.o. female)   Treatment Date: 2024   :  1940 MRN: [id number]   Visit #: 18   Insurance Allowable Auth Needed   BOMN []Yes    [x]No    Insurance: Payor: MEDICARE / Plan: MEDICARE PART A AND B / Product Type: *No Product type* /   Insurance ID: [id number] - (Medicare)  Secondary Insurance (if applicable): GENERIC FIRST [facility]*   Treatment Diagnosis:     ICD-10-CM    1. 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 Shoulder weakness  R29.898                Biceps tenodesis   3. 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  RTSA DOS: 23    Preferred Language for Healthcare:   [x]English       []other:    SUBJECTIVE EXAMINATION     Patient Report/Comments: continues to note improvement in symptoms, still has trouble lifting coffee mug to drink.       Test used Initial score  12/14/23 2024 2024   3/5/2024     Pain Summary VAS 0-1/10 0/10 0/10 0/10   Functional questionnaire SPADI 96/130 31/130 22130    Other:                    OBJECTIVE EXAMINATION     Observation:     2/9 +TTP over anterior

## 2024-03-08 ENCOUNTER — HOSPITAL ENCOUNTER (OUTPATIENT)
Dept: PHYSICAL THERAPY | Age: 84
Setting detail: THERAPIES SERIES
Discharge: HOME | End: 2024-03-08
Payer: MEDICARE

## 2024-03-08 PROCEDURE — 97140 MANUAL THERAPY 1/> REGIONS: CPT | Performed by: SPECIALIST/TECHNOLOGIST

## 2024-03-08 PROCEDURE — 97530 THERAPEUTIC ACTIVITIES: CPT | Performed by: SPECIALIST/TECHNOLOGIST

## 2024-03-08 PROCEDURE — 97110 THERAPEUTIC EXERCISES: CPT | Performed by: SPECIALIST/TECHNOLOGIST

## 2024-03-08 NOTE — FLOWSHEET NOTE
kinesthetic sense, posture, and/or proprioception for sitting and/or standing activities    (25179) MANUAL THERAPY -  Manual therapy techniques, 1 or more regions, each 15 minutes (Mobilization/manipulation, manual lymphatic drainage, manual traction) for the purpose of modulating pain, promoting relaxation,  increasing ROM, reducing/eliminating soft tissue swelling/inflammation/restriction, improving soft tissue extensibility and allowing for proper ROM for normal function with self care, mobility, lifting and ambulation    TREATMENT PLAN     Plan: Reassess readiness to progress strength. Continue with STM to manage soreness. Pt.  Admits to using her pulleys in standing with abduction movements. Pt. Advised to only perform pulleys in seated positions.     Electronically Signed by Gretel Monk PTA, [id number]      Date: 03/08/2024     Note: If patient does not return for scheduled/recommended follow up visits, this note will serve as a discharge from care along with the most recent update on progress.

## 2024-03-12 ENCOUNTER — HOSPITAL ENCOUNTER (OUTPATIENT)
Dept: PHYSICAL THERAPY | Age: 84
Setting detail: THERAPIES SERIES
Discharge: HOME | End: 2024-03-12
Payer: MEDICARE

## 2024-03-12 PROCEDURE — 97110 THERAPEUTIC EXERCISES: CPT

## 2024-03-12 PROCEDURE — 97140 MANUAL THERAPY 1/> REGIONS: CPT

## 2024-03-12 NOTE — FLOWSHEET NOTE
activities related to neuromuscular reeducation of movement, balance, coordination, kinesthetic sense, posture, and/or proprioception for sitting and/or standing activities    (96053) MANUAL THERAPY -  Manual therapy techniques, 1 or more regions, each 15 minutes (Mobilization/manipulation, manual lymphatic drainage, manual traction) for the purpose of modulating pain, promoting relaxation,  increasing ROM, reducing/eliminating soft tissue swelling/inflammation/restriction, improving soft tissue extensibility and allowing for proper ROM for normal function with self care, mobility, lifting and ambulation    TREATMENT PLAN     Plan: continue to reintegrate strength and ROM per patient tolerance.    Electronically Signed by Myron Garcia, PT        Date: 03/12/2024     Note: If patient does not return for scheduled/recommended follow up visits, this note will serve as a discharge from care along with the most recent update on progress.

## 2024-03-15 ENCOUNTER — HOSPITAL ENCOUNTER (OUTPATIENT)
Dept: PHYSICAL THERAPY | Age: 84
Setting detail: THERAPIES SERIES
Discharge: HOME | End: 2024-03-15
Payer: MEDICARE

## 2024-03-15 PROCEDURE — 97140 MANUAL THERAPY 1/> REGIONS: CPT

## 2024-03-15 PROCEDURE — 97110 THERAPEUTIC EXERCISES: CPT

## 2024-03-15 PROCEDURE — 97530 THERAPEUTIC ACTIVITIES: CPT

## 2024-03-15 NOTE — FLOWSHEET NOTE
[facility] - Outpatient Rehabilitation and Therapy [address], [place] [address] office: [phone] fax: [phone]      Physical Therapy: TREATMENT/PROGRESS NOTE   Patient: Le Melgar (84 y.o. female)   Treatment Date: 03/15/2024   :  1940 MRN: [id number]   Visit #: 21   Insurance Allowable Auth Needed   BOMN []Yes    [x]No    Insurance: Payor: MEDICARE / Plan: MEDICARE PART A AND B / Product Type: *No Product type* /   Insurance ID: [id number] - (Medicare)  Secondary Insurance (if applicable): GENERIC FIRST [facility]*   Treatment Diagnosis:     ICD-10-CM    1. 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 Shoulder weakness  R29.898                Biceps tenodesis   3. 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  RTSA DOS: 23    Preferred Language for Healthcare:   [x]English       []other:    SUBJECTIVE EXAMINATION     Patient Report/Comments: Feels like she is slowly improving in her ability to use her arm.        Test used Initial score  12/14/23 2024 2024   3/15/2024     Pain Summary VAS 0-1/10 0/10 0/10 .5/10, soreness   Functional questionnaire SPADI 96/130 31/130 22/130    Other:                    OBJECTIVE EXAMINATION     Observation:   3/8 PROM with excellent RT

## 2024-03-19 ENCOUNTER — HOSPITAL ENCOUNTER (OUTPATIENT)
Dept: PHYSICAL THERAPY | Age: 84
Setting detail: THERAPIES SERIES
Discharge: HOME | End: 2024-03-19
Payer: MEDICARE

## 2024-03-19 PROCEDURE — 97140 MANUAL THERAPY 1/> REGIONS: CPT

## 2024-03-19 PROCEDURE — 97110 THERAPEUTIC EXERCISES: CPT

## 2024-03-19 NOTE — FLOWSHEET NOTE
[facility] - Outpatient Rehabilitation and Therapy [address], [place] [address] office: [phone] fax: [phone]    Physical Therapy: TREATMENT/PROGRESS NOTE   Patient: Le Melgar (84 y.o. female)   Treatment Date: 2024   :  1940 MRN: [id number]   Visit #: 22   Insurance Allowable Auth Needed   BOMN []Yes    [x]No    Insurance: Payor: MEDICARE / Plan: MEDICARE PART A AND B / Product Type: *No Product type* /   Insurance ID: [id number] - (Medicare)  Secondary Insurance (if applicable): GENERIC [facility]*   Treatment Diagnosis:     ICD-10-CM    1. 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 Shoulder weakness  R29.898                Biceps tenodesis   3. 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  RTSA DOS: 23    Preferred Language for Healthcare:   [x]English       []other:    SUBJECTIVE EXAMINATION     Patient Report/Comments: \"No changes in symptoms.\" Continues to endorse pain with any FF or shoulder abduction. Pain is local to the posterolateral shoulder.     Test used Initial score  12/14/23 2024 2024   3/19/2024     Pain Summary VAS 0-1/10 0/10 0/10 2-3/10, soreness   Functional questionnaire SPADI 96/130 31/130 22/130    Other:

## 2024-03-22 ENCOUNTER — HOSPITAL ENCOUNTER (OUTPATIENT)
Dept: PHYSICAL THERAPY | Age: 84
Setting detail: THERAPIES SERIES
Discharge: HOME | End: 2024-03-22
Payer: MEDICARE

## 2024-03-22 PROCEDURE — 97140 MANUAL THERAPY 1/> REGIONS: CPT | Performed by: SPECIALIST/TECHNOLOGIST

## 2024-03-22 PROCEDURE — 97110 THERAPEUTIC EXERCISES: CPT | Performed by: SPECIALIST/TECHNOLOGIST

## 2024-03-22 NOTE — FLOWSHEET NOTE
as indicated by patients functional deficits.  Status: [x] Progressing: [] Met: [] Not Met: [] Adjusted  Patient able to actively raise shoulder to 120 deg to improve [place] activities such as bathing or cleaning her house    Status: [x] Progressing: [] Met: [] Not Met: [] Adjusted  Patient will return to gardening without increased symptoms or restriction to work towards return to prior level of function  Status: [x] Progressing: [] Met: [] Not Met: [] Adjusted  Patient will increase UE function to allow independence in all self-care activities.  Status: [x] Progressing: [] Met: [] Not Met: [] Adjusted    Overall Progression Towards Functional goals/ Treatment Progress Update:  [] Patient is progressing as expected towards functional goals listed.    [x] Progression is slowed due to complexities/Impairments listed.  [x] Progression has been slowed due to co-morbidities.  [] Plan just implemented, too soon (<30days) to assess goals progression   [] Goals require adjustment due to lack of progress  [] Patient is not progressing as expected and requires additional follow up with physician  [] Other:     CHARGE CAPTURE     PT CHARGE GRID   CPT Code (TIMED) minutes # CPT Code (UNTIMED) #     Therex (07414)  25 2  EVAL:LOW (56546 - Typically 20 minutes face-to-face)     Neuromusc. Re-ed (47096)    Re-Eval (79515)     Manual (88584) 15 1  Estim Unattended (39678)     Ther. Act (37008)    [facility]. Traction (22275)     Gait (04155)    Dry Needle 1-2 muscle (20560)     Aquatic Therex (43363)    Dry Needle 3+ muscle (20561)     Iontophoresis (24172)    VASO (48031)     Ultrasound (64844)    Group Therapy (92123)     Estim Attended (70172)    Canalith Repositioning (35725)     Other:    Other:    Total Timed Code Tx Minutes 40 3         Charge Justification:  (43038) THERAPEUTIC EXERCISE - Provided verbal/tactile cueing for activities related to strengthening, flexibility, endurance, ROM performed to prevent loss of range of motion,

## 2024-03-26 ENCOUNTER — HOSPITAL ENCOUNTER (OUTPATIENT)
Dept: PHYSICAL THERAPY | Age: 84
Setting detail: THERAPIES SERIES
Discharge: HOME | End: 2024-03-26
Payer: MEDICARE

## 2024-03-26 PROCEDURE — 97140 MANUAL THERAPY 1/> REGIONS: CPT

## 2024-03-26 PROCEDURE — 97530 THERAPEUTIC ACTIVITIES: CPT

## 2024-03-26 NOTE — PLAN OF CARE
[facility] - Outpatient Rehabilitation and Therapy [address], [place] [address] office: [phone] fax: [phone]    Physical Therapy Re-Certification Plan of Care    Dear Phani Galvan MD  ,    We had the pleasure of treating the following patient for physical therapy services at [facility] Physical Therapy. A summary of our findings can be found in the updated assessment below.  This includes our plan of care.  If you have any questions or concerns regarding these findings, please do not hesitate to contact me at the office phone number checked above.  Thank you for the referral.     Physician Signature:________________________________Date:__________________  By signing above (or electronic signature), therapist's plan is approved by physician      Functional Outcome: SPADI: - 1/130    Overall Response to Treatment:   []Patient is responding well to treatment and improvement is noted with regards to goals   []Patient should continue to improve in reasonable time if they continue HEP   []Patient has plateaued and is no longer responding to skilled PT intervention    []Patient is getting worse and would benefit from return to referring MD   []Patient unable to adhere to initial POC   [x]Other: Patient reports she is finally seeing improvements in shoulder symptoms since her increase in pain several weeks ago. Have continued to modify or limit home exercises and exercises performed in clinic secondary to flare of symptoms. She has returned to performing all activities around her home and does not voice any limitations. States pain at its worse is 1/10 and has avoiding excessive strain. She has on going asymmetrical weakness but this is to be expected given her surgical changes. I would like to follow up with patient on Friday to discuss physician visit and finalize HEP. Then I will see her back in 4 weeks to assess progression of HEP and likely complete

## 2024-03-27 ENCOUNTER — OFFICE VISIT (OUTPATIENT)
Dept: ORTHOPEDIC SURGERY | Age: 84
End: 2024-03-27
Payer: MEDICARE

## 2024-03-27 DIAGNOSIS — Z96.611 STATUS POST REVERSE TOTAL REPLACEMENT OF RIGHT SHOULDER: Primary | ICD-10-CM

## 2024-03-27 PROCEDURE — G8420 CALC BMI NORM PARAMETERS: HCPCS | Performed by: ORTHOPAEDIC SURGERY

## 2024-03-27 PROCEDURE — 1090F PRES/ABSN URINE INCON ASSESS: CPT | Performed by: ORTHOPAEDIC SURGERY

## 2024-03-27 PROCEDURE — 99213 OFFICE O/P EST LOW 20 MIN: CPT | Performed by: ORTHOPAEDIC SURGERY

## 2024-03-27 PROCEDURE — G8484 FLU IMMUNIZE NO ADMIN: HCPCS | Performed by: ORTHOPAEDIC SURGERY

## 2024-03-27 PROCEDURE — G8399 PT W/DXA RESULTS DOCUMENT: HCPCS | Performed by: ORTHOPAEDIC SURGERY

## 2024-03-27 PROCEDURE — 1124F ACP DISCUSS-NO DSCNMKR DOCD: CPT | Performed by: ORTHOPAEDIC SURGERY

## 2024-03-27 PROCEDURE — 1036F TOBACCO NON-USER: CPT | Performed by: ORTHOPAEDIC SURGERY

## 2024-03-27 PROCEDURE — G8427 DOCREV CUR MEDS BY ELIG CLIN: HCPCS | Performed by: ORTHOPAEDIC SURGERY

## 2024-03-27 NOTE — PROGRESS NOTES
History of Present Illness:  Le Melgar is a 84 y.o. female who presents for a post operative visit.  The patient underwent a right reverse total shoulder arthroplasty on 12/5/2023.  She is currently 3.5 months postop.  She denies any pain or instability with the shoulder.  She reports that she has been going to therapy at the [facility] although she feels she can begin to cut back on frequency. She is very happy with her shoulder and feels she is making good progress. She is wondering what her current restrictions are - we can certainly discuss this today.     The patient denies fevers, chills, numbness, tingling, and shortness of breath.    Medical History:  Patient's medications, allergies, past medical, surgical, social and family histories were reviewed and updated as appropriate.    No notes on file    Review of Systems  A 14 point review of systems was completed by the patient is available in the media section of the scanned medical record and was reviewed on 3/27/2024.      Vital Signs:  There were no vitals filed for this visit.    General/Appearance: Alert and oriented and in no apparent distress.    Skin:  There are no skin lesions, cellulitis, or extreme edema. The patient has warm and well-perfused Bilateral upper extremities with brisk capillary refill.      Right Shoulder Exam:    Inspection: Incision is fully healed. There is no erythema, drainage or other signs of infection    Palpation:  No crepitus to gentle motion    Active Range of Motion: Forward elevation of 130 degrees, Abduction is to 140 vs 160 external rotation of 60 and internal rotation to the T10    Passive Range of Motion: Deferred    Strength: 4-/5 external rotation, 4/5 internal rotation , Negative belly press, negative Dixie    Neurovascular: Sensation to light touch is intact, no motor deficits, palpable radial pulses 2+    Radiology:     Plain radiographs not obtained today.         Assessment :  Ms. Le Melgar is

## 2024-03-29 ENCOUNTER — HOSPITAL ENCOUNTER (OUTPATIENT)
Dept: PHYSICAL THERAPY | Age: 84
Setting detail: THERAPIES SERIES
Discharge: HOME | End: 2024-03-29
Payer: MEDICARE

## 2024-03-29 PROCEDURE — 97110 THERAPEUTIC EXERCISES: CPT

## 2024-03-29 NOTE — FLOWSHEET NOTE
[facility] - Outpatient Rehabilitation and Therapy [address], [place] [address] office: [phone] fax: [phone]    Physical Therapy Re-Certification Plan of Care    Dear Phani Galvan MD  ,    We had the pleasure of treating the following patient for physical therapy services at [facility]. A summary of our findings can be found in the updated assessment below.  This includes our plan of care.  If you have any questions or concerns regarding these findings, please do not hesitate to contact me at the office phone number checked above.  Thank you for the referral.     Physician Signature:________________________________Date:__________________  By signing above (or electronic signature), therapist's plan is approved by physician      Functional Outcome: SPADI: - 1/130    Overall Response to Treatment:   []Patient is responding well to treatment and improvement is noted with regards to goals   []Patient should continue to improve in reasonable time if they continue HEP   []Patient has plateaued and is no longer responding to skilled PT intervention    []Patient is getting worse and would benefit from return to referring MD   []Patient unable to adhere to initial POC   [x]Other: Patient reports she is finally seeing improvements in shoulder symptoms since her increase in pain several weeks ago. Have continued to modify or limit home exercises and exercises performed in clinic secondary to flare of symptoms. She has returned to performing all activities around her home and does not voice any limitations. States pain at its worse is 1/10 and has avoiding excessive strain. She has on going asymmetrical weakness but this is to be expected given her surgical changes. I would like to follow up with patient on Friday to discuss physician visit and finalize HEP. Then I will see her back in 4 weeks to assess progression of HEP and likely complete

## 2024-04-09 ENCOUNTER — HOSPITAL ENCOUNTER (OUTPATIENT)
Dept: PHYSICAL THERAPY | Age: 84
Setting detail: THERAPIES SERIES
Discharge: HOME | End: 2024-04-09
Payer: MEDICARE

## 2024-04-09 PROCEDURE — 97140 MANUAL THERAPY 1/> REGIONS: CPT

## 2024-04-09 PROCEDURE — 97110 THERAPEUTIC EXERCISES: CPT

## 2024-04-09 NOTE — FLOWSHEET NOTE
[facility] - Outpatient Rehabilitation and Therapy [address], [place] [address] office: [phone] fax: [phone]    Physical Therapy: TREATMENT/PROGRESS NOTE   Patient: Le Melgar (84 y.o. female)   Treatment Date: 2024   :  1940 MRN: [id number]   Visit #: 26   Insurance Allowable Auth Needed   BOMN []Yes    [x]No    Insurance: Payor: MEDICARE / Plan: MEDICARE PART A AND B / Product Type: *No Product type* /   Insurance ID: [id number] - (Medicare)  Secondary Insurance (if applicable): GENERIC FIRST [facility]*   Treatment Diagnosis:     ICD-10-CM    1. 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 Shoulder weakness  R29.898                Biceps tenodesis   3. Decreased range of motion of right shoulder  M25.611          Medical Diagnosis:    Status post reverse total replacement of right shoulder [Z96.611]   Referring Physician: Phani Galvan MD  PCP: Ronald Pop MD                             Plan of care signed (Y/N): N    Date of Patient follow up with Physician: 3/27/24 QI      Progress Report/POC: NO  POC update due: (10 visits /OR AUTH LIMITS, whichever is less) 2024     Precautions/ Contra-indications:                                                                                          Latex allergy:  NO  Pacemaker:    NO  Contraindications for Manipulation: osteoporosis  and unhealthy / multiple comorbidities   Date of Surgery: Rt.  RTSA DOS: 23    Preferred Language for Healthcare:   [x]English       []other:    SUBJECTIVE EXAMINATION     Patient Report/Comments: Patient presents to PT admitting of a fall she had over the weekend. State she fell primarily on to her right knee but did fall onto an out stretched arm. States she has not had any shoulder pain or complain of issues of weakness. Admits to hitting her head.      Test used Initial score  12/14/23 2024 2024   3/27/2024

## 2024-04-23 ENCOUNTER — HOSPITAL ENCOUNTER (OUTPATIENT)
Dept: PHYSICAL THERAPY | Age: 84
Setting detail: THERAPIES SERIES
Discharge: HOME | End: 2024-04-23
Payer: MEDICARE

## 2024-04-23 PROCEDURE — 97110 THERAPEUTIC EXERCISES: CPT

## 2024-04-23 NOTE — DISCHARGE SUMMARY
Bent  - 2 x daily - 10 reps - 5 second hold    Access Code: [email or web address]  URL: [email or web address]  Date: 03/29/2024  Prepared by: Myron Garcia    Exercises  - Standing Shoulder Row with Anchored Resistance  - 3 x weekly - 2 sets - 15 reps  - Shoulder extension with resistance - Neutral  - 3 x weekly - 2 sets - 15 reps  - Shoulder Internal Rotation with Resistance  - 3 x weekly - 2 sets - 15 reps  - Standing Bicep Curls with Dumbbells and Rotation  - 3 x weekly - 2 sets - 15 reps  - Shoulder Flexion Wall Slide with Towel  - 3 x weekly - 2 sets - 15 reps  - Supine Single Arm Press with Dumbbell  - 3 x weekly - 2 sets - 15 reps  - Sidelying Shoulder External Rotation  - 3 x weekly - 2 sets - 15 reps    ASSESSMENT     Today's Assessment: Patient has reached max benefit from skilled care at this time. All goals have been met, questions and concerns addressed. Discharge will be completed this date. .        Medical Necessity Documentation:  I certify that this patient meets the below criteria necessary for medical necessity for care and/or justification of therapy services:  The patient has functional impairments and/or activity limitations and would benefit from continued outpatient therapy services to address the deficits outlined in the patients goals    Treatment/Activity Tolerance:  [x] Patient tolerated treatment well [] Patient limited by fatique  [] Patient limited by pain  [] Patient limited by other medical complications  [] Other:     Return to Play: NA    Prognosis for POC: [x] Good [] Fair  [] Poor    Patient requires continued skilled intervention: [x] Yes  [] No      GOALS     Patient stated goal: \"Regain use of right arm\"  Status: [] Progressing: [] Met: [] Not Met: [] Adjusted     Therapist goals for Patient:   Short Term Goals: To be achieved in: 2-4 weeks  Independent in HEP and progression per patient tolerance, in order to progress toward full function and prevent re-injury.               Status:

## 2024-05-07 ENCOUNTER — APPOINTMENT (OUTPATIENT)
Dept: PHYSICAL THERAPY | Age: 84
End: 2024-05-07
Payer: MEDICARE

## 2024-05-10 ENCOUNTER — APPOINTMENT (OUTPATIENT)
Dept: PHYSICAL THERAPY | Age: 84
End: 2024-05-10
Payer: MEDICARE

## 2024-06-25 ENCOUNTER — HOSPITAL ENCOUNTER (OUTPATIENT)
Dept: WOMENS IMAGING | Age: 84
Discharge: HOME | End: 2024-06-25
Payer: MEDICARE

## 2024-06-25 ENCOUNTER — OFFICE VISIT (OUTPATIENT)
Dept: SURGERY | Age: 84
End: 2024-06-25
Payer: MEDICARE

## 2024-06-25 VITALS — WEIGHT: 105 LBS | HEIGHT: 60 IN | BODY MASS INDEX: 20.62 KG/M2

## 2024-06-25 VITALS
DIASTOLIC BLOOD PRESSURE: 72 MMHG | WEIGHT: 110 LBS | HEIGHT: 60 IN | HEART RATE: 80 BPM | OXYGEN SATURATION: 99 % | SYSTOLIC BLOOD PRESSURE: 124 MMHG | BODY MASS INDEX: 21.6 KG/M2

## 2024-06-25 DIAGNOSIS — Z12.31 SCREENING MAMMOGRAM, ENCOUNTER FOR: ICD-10-CM

## 2024-06-25 DIAGNOSIS — Z12.31 VISIT FOR SCREENING MAMMOGRAM: ICD-10-CM

## 2024-06-25 DIAGNOSIS — Z85.3 ENCOUNTER FOR FOLLOW-UP SURVEILLANCE OF BREAST CANCER: ICD-10-CM

## 2024-06-25 DIAGNOSIS — Z12.39 ENCOUNTER FOR SCREENING BREAST EXAMINATION: Primary | ICD-10-CM

## 2024-06-25 DIAGNOSIS — Z90.12 HISTORY OF PARTIAL MASTECTOMY OF LEFT BREAST: ICD-10-CM

## 2024-06-25 DIAGNOSIS — Z12.31 SCREENING MAMMOGRAM, ENCOUNTER FOR: Primary | ICD-10-CM

## 2024-06-25 DIAGNOSIS — Z08 ENCOUNTER FOR FOLLOW-UP SURVEILLANCE OF BREAST CANCER: ICD-10-CM

## 2024-06-25 DIAGNOSIS — Z85.3 PERSONAL HISTORY OF BREAST CANCER: ICD-10-CM

## 2024-06-25 PROCEDURE — G8420 CALC BMI NORM PARAMETERS: HCPCS | Performed by: NURSE PRACTITIONER

## 2024-06-25 PROCEDURE — G8427 DOCREV CUR MEDS BY ELIG CLIN: HCPCS | Performed by: NURSE PRACTITIONER

## 2024-06-25 PROCEDURE — 77063 BREAST TOMOSYNTHESIS BI: CPT

## 2024-06-25 PROCEDURE — 99213 OFFICE O/P EST LOW 20 MIN: CPT | Performed by: NURSE PRACTITIONER

## 2024-06-25 PROCEDURE — 1124F ACP DISCUSS-NO DSCNMKR DOCD: CPT | Performed by: NURSE PRACTITIONER

## 2024-06-25 PROCEDURE — 3078F DIAST BP <80 MM HG: CPT | Performed by: NURSE PRACTITIONER

## 2024-06-25 PROCEDURE — 1090F PRES/ABSN URINE INCON ASSESS: CPT | Performed by: NURSE PRACTITIONER

## 2024-06-25 PROCEDURE — G8399 PT W/DXA RESULTS DOCUMENT: HCPCS | Performed by: NURSE PRACTITIONER

## 2024-06-25 PROCEDURE — 1036F TOBACCO NON-USER: CPT | Performed by: NURSE PRACTITIONER

## 2024-06-25 PROCEDURE — 3074F SYST BP LT 130 MM HG: CPT | Performed by: NURSE PRACTITIONER

## 2024-06-25 NOTE — PROGRESS NOTES
History of Breast Cancer 2002 - ER/KS negative, HER-2 positive, s/p partial mastectomy with SLNB  - Personal history of breast cancer 2011 - ER/KS positive, HER-2 negative, s/p partial mastectomy  -Personal history of breast cancer 2019 - left breast recurrence, dermal, ER negative, KS negative, HER-2 negative, s/p excision of dermal metastasis, s/p adjuvant radiation therapy  - Encounter for follow-up surveillance of breast cancer       PLAN:   Continue annual screening mammography.  Due 6/2025  Recommend annual clinical breast exam  Signs/symptoms of recurrence were reviewed.  She verbalizes understanding that she should notify the office if she identifies any abnormalities on self evaluation.  Follow up cancer surveillance discussed   Discussed the importance of breast awareness including the importance and technique of self breast exams  Today's imaging was reviewed and the results were discussed with the patient, all questions answered  Education provided for Healthy Lifestyle Recommendations: healthy diet, routine exercise, weight control, decreased alcohol consumption.  Follow up after mammogram           BILL Reynaga-CNP  [facility]   Surgical Breast Oncology   [phone]    All of the patient's questions were answered at this time however, she was encouraged to call the office with any further inquiries.    Approximately 20 minutes of time were spent in preparation, direct patient contact, counseling, care coordination, documentation and activities otherwise related to this encounter.

## 2024-07-22 ENCOUNTER — OFFICE VISIT (OUTPATIENT)
Dept: ORTHOPEDIC SURGERY | Age: 84
End: 2024-07-22

## 2024-07-22 VITALS — WEIGHT: 110 LBS | HEIGHT: 65 IN | BODY MASS INDEX: 18.33 KG/M2

## 2024-07-22 DIAGNOSIS — S46.911A SHOULDER STRAIN, RIGHT, INITIAL ENCOUNTER: ICD-10-CM

## 2024-07-22 DIAGNOSIS — Z96.611 STATUS POST REVERSE TOTAL REPLACEMENT OF RIGHT SHOULDER: Primary | ICD-10-CM

## 2024-07-22 DIAGNOSIS — M25.511 RIGHT SHOULDER PAIN, UNSPECIFIED CHRONICITY: ICD-10-CM

## 2024-07-22 NOTE — PROGRESS NOTES
Chief Complaint:   Chief Complaint   Patient presents with    Shoulder Pain     Right Shoulder - New pain after having a mammogram in June. Pain was sharp and stabbing and lasted a little over 3 weeks. Pain is starting to subside now.          History of Present Illness:       Patient is a 84 y.o. female returns follow up for the above complaint.  Patient has history of R TSA 12/5/2023.  She noted the onset of pain the day subsequent to obtaining a screening mammogram.  Fortunately the pain has subsided and essentially resolved within the past 1 week without specific treatment other than activity modification.      Status post reverse TSA 12/5/2023: Right shoulder arthrotomy with open biceps tenodesis and reverse total shoulder replacement-Dr. LUIZ Galvan.  Operative note was referenced in the medical record    Course of physical therapy without complication and was released from follow-up related to the aforementioned surgery    The symptoms do not show a typical rotator cuff provacative pattern.There are not associated mechanical symptoms localizing to the shoulder. The patient denies symptoms of instability about the shoulder.     Treatment to date has included nothing specific.      Pain levels 0.    The symptoms do not correlate with head and neck movement. The patient denies new onset weakness of the upper extremity.    Workup has included: none    There is no history or autoimmune disease, inflammatory arthropathy or crystal arthropathy.          Past Medical History:        Past Medical History:   Diagnosis Date    Breast cancer (HCC) 2002 2011 & 2019. Left side    Cataract     Chipped tooth     Right upper anterior/bonded in past    Glaucoma of left eye     Glaucoma of right eye     History of therapeutic radiation     HTN, goal below 150/90     HTN, goal below 150/90     Osteoarthritis     Osteoporosis         Present Medications:         Current Outpatient Medications   Medication Sig Dispense Refill

## 2025-01-29 ENCOUNTER — OFFICE VISIT (OUTPATIENT)
Dept: ORTHOPEDIC SURGERY | Age: 85
End: 2025-01-29

## 2025-01-29 VITALS — BODY MASS INDEX: 18.33 KG/M2 | WEIGHT: 110 LBS | HEIGHT: 65 IN

## 2025-01-29 DIAGNOSIS — Z96.611 STATUS POST REVERSE TOTAL REPLACEMENT OF RIGHT SHOULDER: Primary | ICD-10-CM

## 2025-01-31 NOTE — PROGRESS NOTES
Chief Complaint    Shoulder Pain (RIGHT SHOULDER)      History of Present Illness:  Le Melgar is a pleasant, 84 y.o., female, here today for follow up of her right shoulder. She is now 14 months out follow a right reverse total shoulder arthroplasty. Overall she reports that she is doing well. She does report she had a flare up after a mammogram. Following this she saw Dr. Oli Hilton. She underwent imaging at that visit and there was no damage done to the replacement. She reports no new injuries or setbacks.     Pain Assessment  Location of Pain: Shoulder  Location Modifiers: Right  Severity of Pain: 0  Aggravating Factors: Other (Comment)  Relieving Factors: Rest, Exercise, Other (Comment)  Work-Related Injury: No  Are there other pain locations you wish to document?: No      Medical History:  Patient's medications, allergies, past medical, surgical, social and family histories were reviewed and updated as appropriate.    No notes on file    Review of Systems  A 14 point review of systems was completed by the patient and is available in the media section of the scanned medical record and was reviewed on 1/31/2025.  The review is negative with the exception of those things mentioned in the HPI and Past Medical History    Vital Signs:  There were no vitals filed for this visit.    General/Appearance: Alert and oriented and in no apparent distress.    Skin:  There are no skin lesions, cellulitis, or extreme edema. The patient has warm and well-perfused Bilateral upper extremities with brisk capillary refill.      Right Shoulder Exam:  Inspection:  No gross deformities, no signs of infection.    Palpation: Deferred    Active Range of Motion:  Forward Elevation 160, Abduction 90, External Rotation 60, Internal Rotation T7    Passive Range of Motion:Deferred    Strength: Deferred    Special Tests:   No Nacho muscle deformity.    Neurovascular: Sensation to light touch is intact, no motor deficits, palpable

## 2025-07-01 ENCOUNTER — OFFICE VISIT (OUTPATIENT)
Dept: SURGERY | Age: 85
End: 2025-07-01
Payer: MEDICARE

## 2025-07-01 ENCOUNTER — HOSPITAL ENCOUNTER (OUTPATIENT)
Dept: WOMENS IMAGING | Age: 85
Discharge: HOME | End: 2025-07-01
Payer: MEDICARE

## 2025-07-01 VITALS
HEIGHT: 60 IN | RESPIRATION RATE: 17 BRPM | WEIGHT: 115.6 LBS | OXYGEN SATURATION: 95 % | HEART RATE: 73 BPM | BODY MASS INDEX: 22.7 KG/M2

## 2025-07-01 VITALS — HEIGHT: 60 IN | WEIGHT: 116 LBS | BODY MASS INDEX: 22.78 KG/M2

## 2025-07-01 DIAGNOSIS — Z08 ENCOUNTER FOR FOLLOW-UP SURVEILLANCE OF BREAST CANCER: ICD-10-CM

## 2025-07-01 DIAGNOSIS — Z08 ENCOUNTER FOR FOLLOW-UP SURVEILLANCE OF BREAST CANCER: Primary | ICD-10-CM

## 2025-07-01 DIAGNOSIS — Z85.3 PERSONAL HISTORY OF BREAST CANCER: ICD-10-CM

## 2025-07-01 DIAGNOSIS — Z85.3 ENCOUNTER FOR FOLLOW-UP SURVEILLANCE OF BREAST CANCER: ICD-10-CM

## 2025-07-01 DIAGNOSIS — Z85.3 ENCOUNTER FOR FOLLOW-UP SURVEILLANCE OF BREAST CANCER: Primary | ICD-10-CM

## 2025-07-01 DIAGNOSIS — Z12.31 SCREENING MAMMOGRAM, ENCOUNTER FOR: ICD-10-CM

## 2025-07-01 DIAGNOSIS — Z12.31 ENCOUNTER FOR SCREENING MAMMOGRAM FOR MALIGNANT NEOPLASM OF BREAST: ICD-10-CM

## 2025-07-01 DIAGNOSIS — Z90.12 HISTORY OF PARTIAL MASTECTOMY OF LEFT BREAST: ICD-10-CM

## 2025-07-01 DIAGNOSIS — Z12.39 ENCOUNTER FOR SCREENING BREAST EXAMINATION: Primary | ICD-10-CM

## 2025-07-01 PROCEDURE — 1036F TOBACCO NON-USER: CPT | Performed by: NURSE PRACTITIONER

## 2025-07-01 PROCEDURE — G8420 CALC BMI NORM PARAMETERS: HCPCS | Performed by: NURSE PRACTITIONER

## 2025-07-01 PROCEDURE — 1124F ACP DISCUSS-NO DSCNMKR DOCD: CPT | Performed by: NURSE PRACTITIONER

## 2025-07-01 PROCEDURE — G8399 PT W/DXA RESULTS DOCUMENT: HCPCS | Performed by: NURSE PRACTITIONER

## 2025-07-01 PROCEDURE — G8427 DOCREV CUR MEDS BY ELIG CLIN: HCPCS | Performed by: NURSE PRACTITIONER

## 2025-07-01 PROCEDURE — 1126F AMNT PAIN NOTED NONE PRSNT: CPT | Performed by: NURSE PRACTITIONER

## 2025-07-01 PROCEDURE — 77063 BREAST TOMOSYNTHESIS BI: CPT

## 2025-07-01 PROCEDURE — 99213 OFFICE O/P EST LOW 20 MIN: CPT | Performed by: NURSE PRACTITIONER

## 2025-07-01 PROCEDURE — 1159F MED LIST DOCD IN RCRD: CPT | Performed by: NURSE PRACTITIONER

## 2025-07-01 PROCEDURE — 1090F PRES/ABSN URINE INCON ASSESS: CPT | Performed by: NURSE PRACTITIONER

## 2025-07-01 NOTE — PROGRESS NOTES
Surgical Breast Oncology      Medical Oncologist:[facility]  Radiation Oncologist: grass      CC: Annual [id number] for breast check and mammogram      HPI: Le Melgar is a 85 y.o. woman here for annual follow up for breast check secondary to personal history of left breast cancer.  She is has a history of left breast cancer 2002, left breast cancer recurrence in 2011, and another recurrence of the left breast cancer with dermal involvement in 2019.    She has no breast related concerns today.  She states that she does not perform routine self breast evaluations and has not noticed any new abnormalities such as masses, skin changes, color changes, nipple discharge, or changes to the nipple-areolar complex.     Declines genetic testing.    Bilateral screening mammogram today reveals no concerning findings suggestive of malignancy.  BI-RADS 2.        INTERVAL HX:  She has declined adjuvant therapies in the past.  This included adjuvant radiation.      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 There were 0/3 lymph nodes involved with carcinoma. She declined adjuvant radiation or endocrine therapy that time.     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 Tumor size was 2.1 cm. ER positive [place] positive HER-2 negative. She declined adjuvant therapy.     On 3/29/2019 she underwent bilateral screening mammography. There are no suspicious masses calcifications or distortions noted in either breast. There is a stable postsurgical scar. There is no mammographic evidence of malignancy. BI-RADS 2.     On 3/29/2019 she underwent left breast/chest wall punch biopsy. Pathology identified dermal involvement of invasive mammary carcinoma consistent with her history of adenoid cystic carcinoma. ER negative OH

## (undated) DEVICE — SPONGE LAP W18XL18IN WHT COT 4 PLY FLD STRUNG RADPQ DISP ST

## (undated) DEVICE — GOWN,SIRUS,POLYRNF,BRTHSLV,XLN/XXL,18/CS: Brand: MEDLINE

## (undated) DEVICE — SOLUTION IV IRRIG 500ML 0.9% SODIUM CHL 2F7123

## (undated) DEVICE — STAPLER SKIN H3.9MM WIRE DIA0.58MM CRWN 6.9MM 35 STPL ROT

## (undated) DEVICE — PACK PROCEDURE SURG EXTREMITY MFFOP CUST

## (undated) DEVICE — GLOVE ORTHO 8   MSG9480

## (undated) DEVICE — INTENDED FOR TISSUE SEPARATION, AND OTHER PROCEDURES THAT REQUIRE A SHARP SURGICAL BLADE TO PUNCTURE OR CUT.: Brand: BARD-PARKER ® STAINLESS STEEL BLADES

## (undated) DEVICE — CHLORAPREP 26ML ORANGE

## (undated) DEVICE — SOLUTION IV 1000ML 0.9% SOD CHL

## (undated) DEVICE — TOTAL SHOULDER: Brand: MEDLINE INDUSTRIES, INC.

## (undated) DEVICE — GOWN,SIRUS,POLYRNF,BRTHSLV,XL,30/CS: Brand: MEDLINE

## (undated) DEVICE — SUTURE VCRL SZ 2-0 L18IN ABSRB UD CT-1 L36MM 1/2 CIR J839D

## (undated) DEVICE — SUTURE ETHBND EXCEL SZ 2 L30IN NONABSORBABLE GRN L40MM V-37 MX69G

## (undated) DEVICE — MEDICINE CUP, GRADUATED, STER: Brand: MEDLINE

## (undated) DEVICE — BIT DRL L230MM DIA2.5MM ST 3 FLUT QUIK CPL NONRADIOPAQUE

## (undated) DEVICE — 3M™ WARMING BLANKET, LOWER BODY, 10 PER CASE, 42568: Brand: BAIR HUGGER™

## (undated) DEVICE — SUTURE VCRL + SZ 0 L18IN ABSRB UD L36MM CT-1 1/2 CIR VCP840D

## (undated) DEVICE — SUTURE VCRL SZ 3-0 L18IN ABSRB UD L26MM SH 1/2 CIR J864D

## (undated) DEVICE — GLOVE ORANGE PI 8   MSG9080

## (undated) DEVICE — ADHESIVE SKIN CLOSURE WND 8.661X1.5 IN 22 CM LIQUIBAND SECUR

## (undated) DEVICE — SOLUTION IRRIG 3000ML 0.9% SOD CHL USP UROMATIC PLAS CONT

## (undated) DEVICE — SUTURE MCRYL SZ 4-0 L27IN ABSRB UD L19MM PS-2 1/2 CIR PRIM Y426H

## (undated) DEVICE — HANDPIECE SET WITH SUCTION TUBING: Brand: INTERPULSE

## (undated) DEVICE — DRAPE ADAPTABLE ARM POSITIONER

## (undated) DEVICE — NEEDLE HYPO 22GA L1.5IN BLK POLYPR HUB S STL REG BVL STR

## (undated) DEVICE — UNDERGLOVE SURG SZ 8 BLU LTX FREE SYN POLYISOPRENE POLYMER

## (undated) DEVICE — BLADE ES ELASTOMERIC COAT INSUL DURABLE BEND UPTO 90DEG

## (undated) DEVICE — TOWEL,STOP FLAG GOLD N-W: Brand: MEDLINE

## (undated) DEVICE — GLOVE SURG SZ 6.5 L11.2IN FNGR THK9.8MIL STRW LTX POLYMER

## (undated) DEVICE — SKIN AFFIX SURG ADHESIVE 72/CS 0.55ML: Brand: MEDLINE

## (undated) DEVICE — SUTURE MCRYL + SZ 4-0 L27IN ABSRB UD L19MM PS-2 3/8 CIR MCP426H

## (undated) DEVICE — GOWN,REINFRCE,POLY,ECLIPSE,SLV,3XLG: Brand: MEDLINE

## (undated) DEVICE — GLOVE SURG SZ 7 L12IN THK7.5MIL DK GRN LTX FREE MSG6570] MEDLINE INDUSTRIES INC]

## (undated) DEVICE — Device

## (undated) DEVICE — TOWEL,OR,DSP,ST,BLUE,STD,4/PK,20PK/CS: Brand: MEDLINE

## (undated) DEVICE — ROOM TURNOVER KIT W/ ARM STRP

## (undated) DEVICE — Z DUP USE 2605542 BUR SURG 2 FLUT MIC 1.5X19 MM MICROPOWER MIC 100

## (undated) DEVICE — SOLUTION IRRIG 1000ML STRL H2O USP PLAS POUR BTL

## (undated) DEVICE — DRAPE,CHEST,FENES,15X10,STERIL: Brand: MEDLINE

## (undated) DEVICE — YANKAUER,BULB TIP,W/O VENT,RIGID,STERILE: Brand: MEDLINE

## (undated) DEVICE — Z CONVERTED USE 2275871 SPONGE GZ W4XL4IN WHT 8 PLY CURITY

## (undated) DEVICE — 3M™ COBAN™ NL STERILE NON-LATEX SELF-ADHERENT WRAP, 2086S, 6 IN X 5 YD (15 CM X 4,5 M), 12 ROLLS/CASE: Brand: 3M™ COBAN™

## (undated) DEVICE — MAT FLR W32XL58IN